# Patient Record
Sex: FEMALE | Race: BLACK OR AFRICAN AMERICAN | NOT HISPANIC OR LATINO
[De-identification: names, ages, dates, MRNs, and addresses within clinical notes are randomized per-mention and may not be internally consistent; named-entity substitution may affect disease eponyms.]

---

## 2021-11-05 PROBLEM — Z00.00 ENCOUNTER FOR PREVENTIVE HEALTH EXAMINATION: Status: ACTIVE | Noted: 2021-11-05

## 2021-11-30 ENCOUNTER — NON-APPOINTMENT (OUTPATIENT)
Age: 41
End: 2021-11-30

## 2021-11-30 ENCOUNTER — APPOINTMENT (OUTPATIENT)
Dept: ORTHOPEDIC SURGERY | Facility: CLINIC | Age: 41
End: 2021-11-30
Payer: COMMERCIAL

## 2021-11-30 VITALS
HEIGHT: 70 IN | DIASTOLIC BLOOD PRESSURE: 91 MMHG | WEIGHT: 293 LBS | SYSTOLIC BLOOD PRESSURE: 148 MMHG | OXYGEN SATURATION: 97 % | HEART RATE: 65 BPM | BODY MASS INDEX: 41.95 KG/M2

## 2021-11-30 PROCEDURE — 99204 OFFICE O/P NEW MOD 45 MIN: CPT | Mod: 25

## 2021-11-30 PROCEDURE — 73564 X-RAY EXAM KNEE 4 OR MORE: CPT | Mod: RT

## 2021-11-30 PROCEDURE — 20611 DRAIN/INJ JOINT/BURSA W/US: CPT | Mod: RT

## 2021-11-30 RX ORDER — DICLOFENAC SODIUM 50 MG/1
50 TABLET, DELAYED RELEASE ORAL
Qty: 60 | Refills: 1 | Status: ACTIVE | COMMUNITY
Start: 2021-11-30 | End: 1900-01-01

## 2021-11-30 NOTE — PHYSICAL EXAM
[de-identified] : Physical Examination\par General: well nourished, in no acute distress, alert and oriented to person, place and time\par Psychiatric: normal mood and affect, no abnormal movements or speech patterns\par Eyes: vision intact [Without] glasses\par \par \par Musculoskeletal Examination\par Ambulation	+ antalgic gait, [-] assistive devices\par \par Knee			Right			Left\par General\par      Swelling/Deformity	normal			normal	\par      Skin			normal			normal\par      Erythema		-			-\par      Standing Alignment	neutral			neutral\par      Effusion		trace			trace\par Range of Motion\par      Hip			full painless ROM		full painless ROM\par      Knee Flexion		100			100\par      Knee Extension	0			0\par Patella\par      J Sign		-			-\par      Quad Medial/Lateral	1/1 1/1\par      Apprehension		-			-\par      Renato's		+			+\par      Grind Sign		+			+\par      Crepitus		+			+\par Palpation\par      Medial Joint Line	+			+\par      Medial Fem Condyle	-			-\par      Lateral Joint Line	-			-\par      Quad Tendon		-			-\par      Patella Tendon	-			-\par      Medial Patella		-			-\par      Lateral Patella 	-			-\par      Posterior Knee	+			+\par Ligamentous\par      Varus @ 0° / 30°	-/-			-/-\par      Valgus @ 0° / 30°	-/-			-/-\par \par Strength Examination/Atrophy\par      Hip Flexors 		5+			5+\par      Quadriceps		5+			5+\par      Hamstring		5+			5+\par      Tibialis Anterior	5+			5+\par      Achilles/Soleus	5+			5+\par Sensation\par      Deep Peroneal	normal			normal\par      Superficial Peroneal 	normal			normal\par      Sural  		normal			normal\par      Posterior Tibial 	normal			normal\par      Saphneous 		normal			normal\par Pulses\par      DP			2+			2+\par  [de-identified] : 4 views of the affected bilateral knee (standing AP, flexing standing AP, 30degree flexed lateral, sunrise view)\par were ordered, obtained and evaluated by myself today and\par demonstrate:\par \par RIGHT\par There is moderate to severe medial weightbearing asymmetric narrowing\par Small to moderate osteophytic lipping\par Trace suprapatellar effusion\par Moderate patellofemoral joint space loss without evidence of tilt [or] subluxation on sunrise view\par Normal soft tissue density\par Otherwise normal osseous bone structure without fracture or dislocation\par \par LEFT\par There is severe lateral flexing asymmetric narrowing\par Small to moderate osteophytic lipping\par Trace suprapatellar effusion\par Moderate patellofemoral joint space loss without evidence of tilt [or] subluxation on sunrise view\par Normal soft tissue density\par Otherwise normal osseous bone structure without fracture or dislocation

## 2021-11-30 NOTE — HISTORY OF PRESENT ILLNESS
[de-identified] : CC Bilateral knee\par \par HPI 40 yo female presents with [chronic] onset of many years of activity related pain in the anterior and medial Bilateral knee [without injury]. The pain is [worse], and rated a 8  out of 10, described as sharp, [without radiation]. [Rest] makes the pain better and [walking standing stairs] makes the pain worse. The patient reports associated symptoms of pain stability. The patient [Denies] pain at night affecting sleep, and [Reports] similar pain previously[].\par \par The patient has tried the following treatments:\par Activity modification	[-]\par Ice/Compression  	[-]\par Braces    		[-]\par Nsaids    		[-]\par Physical Therapy 	+ soime help\par Cortisone Injection	[-]\par Visco Injection		[-]\par Zilretta			[-]\par Arthroscopy		[-]\par \par Review of Systems is positive for the above musculoskeletal symptoms and is otherwise non-contributory for general, constitutional, psychiatric, neurologic, HEENT, cardiac, respiratory, gastrointestinal, reproductive, lymphatic, and dermatologic complaints.\par \par Consult by Dr Catalina Fabian

## 2021-11-30 NOTE — DISCUSSION/SUMMARY
[de-identified] : Bilateral knee osteoarthritis\par \par \par The patient and I discussed the causes and progression of degenerative joint disease of the knee. Models, diagrams and drawings were used in the discussion. Treatment can include conservative non-operative management and surgical options. Conservative management includes weight loss, activity modification, physical therapy to improve motion and strength in the muscles around the knee and the body's core, PO and topical NSAIDs, corticosteroid and/or viscosupplementation intra-articular injections. If the patient fails to improve with non-operative management, surgical management is possible. Depending upon the patient's age, BMI, activity level, degree and location of arthrosis different surgical options are possible including arthroscopic debridement with chondroplasty, high-tibial osteotomy, unicondylar/partial arthroplasty, and total joint arthroplasty.\par \par Physical therapy was prescribed for knee ROM exercises, strengthening exercises, deep tissue massage, core strengthening, hip abductor strengthening, VMO strengthening, modalities PRN, and home exercises\par \par The patient was prescribed Diclofenac PO non-steroidal anti-inflammatory medication. 50mg tablets twice daily to be taken for at least 1-2 weeks in a row and then PRN afterwards. Risks and benefits were discussed and include but not limited to renal damage and GI ulceration and bleeding.  They were advised to take with food to limit stomach upset as well as warned to stop the medication if worsening gastric pain or dizziness or other side effects. Also to immediately stop the medication and seek appropriate medical attention if any severe stomach ache, gastritis, black/red vomit, black/red stools or any other medical concern.\par \par Procedure Note:\par \par Verbal consent was obtained for an arthrocentesis and intra-articular corticosteroid injection of the LEFT and RIGHT knee, after the risks and benefits were discussed with the patient. Potential adverse effects were discussed including but not limited to bleeding, skin/joint infection, local skin reactions including bleaching, bruising, stiffness, soreness, vasovagal episodes, transient hyperglycemia, avascular necrosis, pseudo-septic type reactions, post injection joint pain, allergic reaction to product or anesthetic and other rare but potential adverse effects along with benefits including decreased pain and improved stability prior to obtaining verbal informed consent. It was also discussed that for some patients the treatment is ineffective and there are no guarantees that the patient will experience improvement as the result of the injection. In rare occasions the injection can cause worsening of pain.\par \par The use of a Sonosite 15-6 MHz linear transducer with live ultrasound guidance of the knee was necessary given the patient's BMI and local body habitus overlying and obscuring the accurate identification of normal body bony anatomy used to identify the injection site and the depth of soft tissue envelope necessitating a longer than normal needle to reach the joint space, and to confirm the location of the needle tip and intra-articular delivery of the medication. Without the use of live ultrasound guidance the injection would have been more difficult and place the patient's neurovascular structures at risk from the longer needle needed to traverse the soft tissue envelope.\par \par A 6 inch bolster was placed underneath the BILATERAL knee to place the knee in a slightly flexed position.\par \par The LEFT knee superolateral injection site was identified using the ultrasound probe to identify the suprapatellar space and superior boarder of the patella first longitudinally and then transversely. The injection site was marked and prepped with a ChloraPrep swab and anesthetized with ethylchloride skin anesthesia. Using sterile technique a 20g 3 1/2 in spinal needle with 3 cc total of 1cc 1% lidocaine without epinephrine, 1cc 0.25% Marcaine without epinephrine and 1cc of 40mg/mL Kenalog was passed through the injection site towards the suprapatellar space under live ultrasound guidance and noted to penetrate the joint capsule. The medication was injected without resistance under live ultrasound visualization and noted to flow into the suprapatellar joint space. The injection site was sterilely dressed, there was minimal blood loss.\par \par The RIGHT knee superolateral injection site was identified using the ultrasound probe to identify the suprapatellar space and superior boarder of the patella first longitudinally and then transversely. The injection site was marked and prepped with a ChloraPrep swab and anesthetized with ethylchloride skin anesthesia. Using sterile technique a 20g 3 1/2 in spinal needle with 3 cc total of 1cc 1% lidocaine without epinephrine, 1cc 0.25% Marcaine without epinephrine and 1cc of 40mg/mL Kenalog was passed through the injection site towards the suprapatellar space under live ultrasound guidance and noted to penetrate the joint capsule. The medication was injected without resistance under live ultrasound visualization and noted to flow into the suprapatellar joint space. The injection site was sterilely dressed, there was minimal blood loss.\par \par The patient tolerated this procedure without any complications done by myself. Images were recorded and saved.\par \par The patient has been advised that if they notice any worsening of symptoms or any problems to contact me and seek care from a qualified medical professional. The patient was instructed to ice the knee and take NSAID medication on an as needed basis if the patient feels discomfort.\par \par \par \par \par \par The patient verifies their understanding the the visit, diagnosis and plan. They agree with the treatment plan and will contact the office with any questions or problems.\par Follow up\par PRN

## 2022-01-10 ENCOUNTER — EMERGENCY (EMERGENCY)
Facility: HOSPITAL | Age: 42
LOS: 1 days | Discharge: HOME | End: 2022-01-10
Attending: EMERGENCY MEDICINE | Admitting: EMERGENCY MEDICINE
Payer: COMMERCIAL

## 2022-01-10 VITALS
OXYGEN SATURATION: 99 % | HEART RATE: 71 BPM | RESPIRATION RATE: 18 BRPM | TEMPERATURE: 97 F | DIASTOLIC BLOOD PRESSURE: 87 MMHG | SYSTOLIC BLOOD PRESSURE: 142 MMHG

## 2022-01-10 DIAGNOSIS — D21.9 BENIGN NEOPLASM OF CONNECTIVE AND OTHER SOFT TISSUE, UNSPECIFIED: Chronic | ICD-10-CM

## 2022-01-10 PROCEDURE — 99284 EMERGENCY DEPT VISIT MOD MDM: CPT

## 2022-01-10 RX ORDER — ACETAMINOPHEN 500 MG
650 TABLET ORAL ONCE
Refills: 0 | Status: COMPLETED | OUTPATIENT
Start: 2022-01-10 | End: 2022-01-10

## 2022-01-10 RX ADMIN — Medication 650 MILLIGRAM(S): at 20:47

## 2022-01-10 NOTE — ED PROVIDER NOTE - OBJECTIVE STATEMENT
42 y/o female with a hx HTN, Anemia, presents to the ER c/o 5 days of congestion, fatigue, headache.  Pt reports COVID exposure.  Pt is COVID vaccinated.  Pt denies chest pain, sob, nausea, vomiting, abdominal pain.

## 2022-01-10 NOTE — ED ADULT TRIAGE NOTE - CHIEF COMPLAINT QUOTE
Pt c/o congestion, headache, nausea, fatigue  x 4 days and reports Covid exposure. PMH htn Pt c/o congestion, headache, fatigue  x 4 days and reports Covid exposure. PMH htn

## 2022-01-10 NOTE — ED ADULT NURSE NOTE - NSFALLRSKASSESSTYPE_ED_ALL_ED
Problem: Patient Care Overview  Goal: Plan of Care Review  Outcome: Ongoing (interventions implemented as appropriate)  POC reviewed with patient and aunt with both verbalizing understanding. Patient currently in A. Flutter with HR in the 120-140's. Patient on diltiazem gtt at max dosing for floor, 15mg/ml/hr. Heparin gtt infusing and titrated based off antiXa. Patient NPO for cardioversion tomorrow. Consents not signed until patient's mother is present. Fall bundle implemented and patient has remained free of falls and injuries. Patient in no apparent sign of distress, will continue to monitor.        Initial (On Arrival)

## 2022-01-10 NOTE — ED PROVIDER NOTE - CLINICAL SUMMARY MEDICAL DECISION MAKING FREE TEXT BOX
42 y/o female with a hx HTN, Anemia, presents to the ER c/o 5 days of congestion, fatigue, headache.  Pt reports COVID exposure.  Pt is well appearing, NAD, lungs CTA, likely viral/COVID, supportive care, follow up PMD. 40 y/o female with a hx HTN, Anemia, presents to the ER c/o 5 days of congestion, fatigue, headache.  Pt reports COVID exposure.  Pt is well appearing, NAD, lungs CTA, likely viral/COVID, supportive care, follow up PMD.    neeraj: 40 yo woman with 5 days of uri sx/ fatigue/ ha.  had covid exposure.  lungs clear, vs ok.  pt non toxic appearing.  can swab and send home for results.  given instruction for symptomatic treatment

## 2022-01-10 NOTE — ED PROVIDER NOTE - ATTENDING CONTRIBUTION TO CARE
neeraj: 42 yo woman with 5 days of uri sx/ fatigue/ ha.  had covid exposure.  lungs clear, vs ok.  pt non toxic appearing.  can swab and send home for results.  given instruction for symptomatic treatment    I performed a history and physical exam of the patient and discussed their management with the resident and /or advanced care provider. I reviewed the resident and /or ACP's note and agree with the documented findings and plan of care. My medical decison making and observations are found above.

## 2022-01-10 NOTE — ED PROVIDER NOTE - PATIENT PORTAL LINK FT
You can access the FollowMyHealth Patient Portal offered by [facility] by registering at the following website: [email or web address]. By joining [facility]’s FollowMyHealth portal, you will also be able to view your health information using other applications (apps) compatible with our system.

## 2022-01-10 NOTE — ED ADULT NURSE NOTE - OBJECTIVE STATEMENT
Patient came in with the complaints of Cold symptoms.Swab and UCG Done.Medications given as ordered.Patient tolerated well.

## 2022-01-11 LAB — SARS-COV-2 RNA SPEC QL NAA+PROBE: DETECTED

## 2022-02-28 PROBLEM — D64.9 ANEMIA, UNSPECIFIED: Chronic | Status: ACTIVE | Noted: 2022-01-10

## 2022-02-28 PROBLEM — I10 ESSENTIAL (PRIMARY) HYPERTENSION: Chronic | Status: ACTIVE | Noted: 2022-01-10

## 2022-03-22 ENCOUNTER — APPOINTMENT (OUTPATIENT)
Dept: ORTHOPEDIC SURGERY | Facility: CLINIC | Age: 42
End: 2022-03-22
Payer: COMMERCIAL

## 2022-03-22 NOTE — HISTORY OF PRESENT ILLNESS
[de-identified] : CC Bilateral knee\par \par HPI 42 yo female presents with 4 mth csi, nsaids fu to [chronic] onset of many years of activity related pain in the anterior and medial Bilateral knee [without injury]. The pain is [worse], and rated a 8  out of 10, described as sharp, [without radiation]. [Rest] makes the pain better and [walking standing stairs] makes the pain worse. The patient reports associated symptoms of pain stability. The patient [Denies] pain at night affecting sleep, and [Reports] similar pain previously[].\par \par The patient has tried the following treatments:\par Activity modification	[-]\par Ice/Compression  	[-]\par Braces    		[-]\par Nsaids    		[-]\par Physical Therapy 	+ soime help\par Cortisone Injection	[+] 11-30-21 help \par Visco Injection		[-]\par Zilretta			[-]\par Arthroscopy		[-]\par \par Review of Systems is positive for the above musculoskeletal symptoms and is otherwise non-contributory for general, constitutional, psychiatric, neurologic, HEENT, cardiac, respiratory, gastrointestinal, reproductive, lymphatic, and dermatologic complaints.\par \par Consult by Dr Catalina Fabian

## 2022-03-22 NOTE — PHYSICAL EXAM
[de-identified] : Physical Examination\par General: well nourished, in no acute distress, alert and oriented to person, place and time\par Psychiatric: normal mood and affect, no abnormal movements or speech patterns\par Eyes: vision intact [Without] glasses\par \par \par Musculoskeletal Examination\par Ambulation	+ antalgic gait, [-] assistive devices\par \par Knee			Right			Left\par General\par      Swelling/Deformity	normal			normal	\par      Skin			normal			normal\par      Erythema		-			-\par      Standing Alignment	neutral			neutral\par      Effusion		trace			trace\par Range of Motion\par      Hip			full painless ROM		full painless ROM\par      Knee Flexion		100			100\par      Knee Extension	0			0\par Patella\par      J Sign		-			-\par      Quad Medial/Lateral	1/1 1/1\par      Apprehension		-			-\par      Renato's		+			+\par      Grind Sign		+			+\par      Crepitus		+			+\par Palpation\par      Medial Joint Line	+			+\par      Medial Fem Condyle	-			-\par      Lateral Joint Line	-			-\par      Quad Tendon		-			-\par      Patella Tendon	-			-\par      Medial Patella		-			-\par      Lateral Patella 	-			-\par      Posterior Knee	+			+\par Ligamentous\par      Varus @ 0° / 30°	-/-			-/-\par      Valgus @ 0° / 30°	-/-			-/-\par \par Strength Examination/Atrophy\par      Hip Flexors 		5+			5+\par      Quadriceps		5+			5+\par      Hamstring		5+			5+\par      Tibialis Anterior	5+			5+\par      Achilles/Soleus	5+			5+\par Sensation\par      Deep Peroneal	normal			normal\par      Superficial Peroneal 	normal			normal\par      Sural  		normal			normal\par      Posterior Tibial 	normal			normal\par      Saphneous 		normal			normal\par Pulses\par      DP			2+			2+\par  [de-identified] : 4 views of the affected bilateral knee (standing AP, flexing standing AP, 30degree flexed lateral, sunrise view)\par 11-30-21 demonstrate: \par \par RIGHT\par There is moderate to severe medial weightbearing asymmetric narrowing\par Small to moderate osteophytic lipping\par Trace suprapatellar effusion\par Moderate patellofemoral joint space loss without evidence of tilt [or] subluxation on sunrise view\par Normal soft tissue density\par Otherwise normal osseous bone structure without fracture or dislocation\par \par LEFT\par There is severe lateral flexing asymmetric narrowing\par Small to moderate osteophytic lipping\par Trace suprapatellar effusion\par Moderate patellofemoral joint space loss without evidence of tilt [or] subluxation on sunrise view\par Normal soft tissue density\par Otherwise normal osseous bone structure without fracture or dislocation

## 2022-03-22 NOTE — DISCUSSION/SUMMARY
[de-identified] : Bilateral knee osteoarthritis\par \par \par The patient and I discussed the causes and progression of degenerative joint disease of the knee. Models, diagrams and drawings were used in the discussion. Treatment can include conservative non-operative management and surgical options. Conservative management includes weight loss, activity modification, physical therapy to improve motion and strength in the muscles around the knee and the body's core, PO and topical NSAIDs, corticosteroid and/or viscosupplementation intra-articular injections. If the patient fails to improve with non-operative management, surgical management is possible. Depending upon the patient's age, BMI, activity level, degree and location of arthrosis different surgical options are possible including arthroscopic debridement with chondroplasty, high-tibial osteotomy, unicondylar/partial arthroplasty, and total joint arthroplasty.\par \par Physical therapy was prescribed for knee ROM exercises, strengthening exercises, deep tissue massage, core strengthening, hip abductor strengthening, VMO strengthening, modalities PRN, and home exercises\par \par The patient was prescribed Diclofenac PO non-steroidal anti-inflammatory medication. 50mg tablets twice daily to be taken for at least 1-2 weeks in a row and then PRN afterwards. Risks and benefits were discussed and include but not limited to renal damage and GI ulceration and bleeding.  They were advised to take with food to limit stomach upset as well as warned to stop the medication if worsening gastric pain or dizziness or other side effects. Also to immediately stop the medication and seek appropriate medical attention if any severe stomach ache, gastritis, black/red vomit, black/red stools or any other medical concern.\par \par Procedure Note:\par \par Verbal consent was obtained for an arthrocentesis and intra-articular corticosteroid injection of the LEFT and RIGHT knee, after the risks and benefits were discussed with the patient. Potential adverse effects were discussed including but not limited to bleeding, skin/joint infection, local skin reactions including bleaching, bruising, stiffness, soreness, vasovagal episodes, transient hyperglycemia, avascular necrosis, pseudo-septic type reactions, post injection joint pain, allergic reaction to product or anesthetic and other rare but potential adverse effects along with benefits including decreased pain and improved stability prior to obtaining verbal informed consent. It was also discussed that for some patients the treatment is ineffective and there are no guarantees that the patient will experience improvement as the result of the injection. In rare occasions the injection can cause worsening of pain.\par \par The use of a Sonosite 15-6 MHz linear transducer with live ultrasound guidance of the knee was necessary given the patient's BMI and local body habitus overlying and obscuring the accurate identification of normal body bony anatomy used to identify the injection site and the depth of soft tissue envelope necessitating a longer than normal needle to reach the joint space, and to confirm the location of the needle tip and intra-articular delivery of the medication. Without the use of live ultrasound guidance the injection would have been more difficult and place the patient's neurovascular structures at risk from the longer needle needed to traverse the soft tissue envelope.\par \par A 6 inch bolster was placed underneath the BILATERAL knee to place the knee in a slightly flexed position.\par \par The LEFT knee superolateral injection site was identified using the ultrasound probe to identify the suprapatellar space and superior boarder of the patella first longitudinally and then transversely. The injection site was marked and prepped with a ChloraPrep swab and anesthetized with ethylchloride skin anesthesia. Using sterile technique a 20g 3 1/2 in spinal needle with 3 cc total of 1cc 1% lidocaine without epinephrine, 1cc 0.25% Marcaine without epinephrine and 1cc of 40mg/mL Kenalog was passed through the injection site towards the suprapatellar space under live ultrasound guidance and noted to penetrate the joint capsule. The medication was injected without resistance under live ultrasound visualization and noted to flow into the suprapatellar joint space. The injection site was sterilely dressed, there was minimal blood loss.\par \par The RIGHT knee superolateral injection site was identified using the ultrasound probe to identify the suprapatellar space and superior boarder of the patella first longitudinally and then transversely. The injection site was marked and prepped with a ChloraPrep swab and anesthetized with ethylchloride skin anesthesia. Using sterile technique a 20g 3 1/2 in spinal needle with 3 cc total of 1cc 1% lidocaine without epinephrine, 1cc 0.25% Marcaine without epinephrine and 1cc of 40mg/mL Kenalog was passed through the injection site towards the suprapatellar space under live ultrasound guidance and noted to penetrate the joint capsule. The medication was injected without resistance under live ultrasound visualization and noted to flow into the suprapatellar joint space. The injection site was sterilely dressed, there was minimal blood loss.\par \par The patient tolerated this procedure without any complications done by myself. Images were recorded and saved.\par \par The patient has been advised that if they notice any worsening of symptoms or any problems to contact me and seek care from a qualified medical professional. The patient was instructed to ice the knee and take NSAID medication on an as needed basis if the patient feels discomfort.\par \par \par \par \par \par The patient verifies their understanding the the visit, diagnosis and plan. They agree with the treatment plan and will contact the office with any questions or problems.\par Follow up\par PRN

## 2022-04-15 ENCOUNTER — APPOINTMENT (OUTPATIENT)
Dept: ORTHOPEDIC SURGERY | Facility: CLINIC | Age: 42
End: 2022-04-15
Payer: COMMERCIAL

## 2022-04-15 PROCEDURE — 99214 OFFICE O/P EST MOD 30 MIN: CPT | Mod: 25

## 2022-04-15 PROCEDURE — 20611 DRAIN/INJ JOINT/BURSA W/US: CPT | Mod: 50

## 2022-04-15 RX ORDER — HYLAN G-F 20 16MG/2ML
48 SYRINGE (ML) INTRAARTICULAR
Qty: 2 | Refills: 0 | Status: ACTIVE | OUTPATIENT
Start: 2022-04-15

## 2022-04-15 RX ORDER — DICLOFENAC SODIUM 50 MG/1
50 TABLET, DELAYED RELEASE ORAL
Qty: 60 | Refills: 1 | Status: ACTIVE | COMMUNITY
Start: 2022-04-15 | End: 1900-01-01

## 2022-04-15 NOTE — HISTORY OF PRESENT ILLNESS
[de-identified] : CC Bilateral knee\par \par HPI 40 yo female presents for corticosteroid injection physical therapy and nonsteroidal anti-inflammatory 6-month follow-up of [chronic] onset of many years of activity related pain in the anterior and medial Bilateral knee [without injury]. The pain is [worse], and rated a 8  out of 10, described as sharp, [without radiation]. [Rest] makes the pain better and [walking standing stairs] makes the pain worse. The patient reports associated symptoms of pain stability. The patient [Denies] pain at night affecting sleep, and [Reports] similar pain previously[].\par \par The patient has tried the following treatments:\par Activity modification	+\par Ice/Compression  	+\par Braces    		[-]\par Nsaids    		+\par Physical Therapy 	+ soime help\par Cortisone Injection	+ November 2021 with 3 to 4-month improvement\par Visco Injection		[-]\par Zilretta			[-]\par Arthroscopy		[-]\par \par Worsening pain to 10 out of 10 over the last several weeks\par \par Review of Systems is positive for the above musculoskeletal symptoms and is otherwise non-contributory for general, constitutional, psychiatric, neurologic, HEENT, cardiac, respiratory, gastrointestinal, reproductive, lymphatic, and dermatologic complaints.\par \par Consult by Dr Catalina Fabian

## 2022-04-15 NOTE — PHYSICAL EXAM
[de-identified] : Physical Examination\par General: well nourished, in no acute distress, alert and oriented to person, place and time\par Psychiatric: normal mood and affect, no abnormal movements or speech patterns\par Eyes: vision intact [Without] glasses\par \par \par Musculoskeletal Examination\par Ambulation	+ antalgic gait, [-] assistive devices\par \par Knee			Right			Left\par General\par      Swelling/Deformity	normal			normal	\par      Skin			normal			normal\par      Erythema		-			-\par      Standing Alignment	neutral			neutral\par      Effusion		trace			trace\par Range of Motion\par      Hip			full painless ROM		full painless ROM\par      Knee Flexion		100			100\par      Knee Extension	0			0\par Patella\par      J Sign		-			-\par      Quad Medial/Lateral	1/1 1/1\par      Apprehension		-			-\par      Renato's		+			+\par      Grind Sign		+			+\par      Crepitus		+			+\par Palpation\par      Medial Joint Line	+			+\par      Medial Fem Condyle	-			-\par      Lateral Joint Line	-			-\par      Quad Tendon		-			-\par      Patella Tendon	-			-\par      Medial Patella		-			-\par      Lateral Patella 	-			-\par      Posterior Knee	+			+\par Ligamentous\par      Varus @ 0° / 30°	-/-			-/-\par      Valgus @ 0° / 30°	-/-			-/-\par \par Strength Examination/Atrophy\par      Hip Flexors 		5+			5+\par      Quadriceps		5+			5+\par      Hamstring		5+			5+\par      Tibialis Anterior	5+			5+\par      Achilles/Soleus	5+			5+\par Sensation\par      Deep Peroneal	normal			normal\par      Superficial Peroneal 	normal			normal\par      Sural  		normal			normal\par      Posterior Tibial 	normal			normal\par      Saphneous 		normal			normal\par Pulses\par      DP			2+			2+\par  [de-identified] : 4 views of the affected bilateral knee (standing AP, flexing standing AP, 30degree flexed lateral, sunrise view)\par 11/20/2021\par demonstrate:\par \par RIGHT\par There is moderate to severe medial weightbearing asymmetric narrowing\par Small to moderate osteophytic lipping\par Trace suprapatellar effusion\par Moderate patellofemoral joint space loss without evidence of tilt [or] subluxation on sunrise view\par Normal soft tissue density\par Otherwise normal osseous bone structure without fracture or dislocation\par \par LEFT\par There is severe lateral flexing asymmetric narrowing\par Small to moderate osteophytic lipping\par Trace suprapatellar effusion\par Moderate patellofemoral joint space loss without evidence of tilt [or] subluxation on sunrise view\par Normal soft tissue density\par Otherwise normal osseous bone structure without fracture or dislocation

## 2022-04-15 NOTE — DISCUSSION/SUMMARY
[de-identified] : Bilateral knee osteoarthritis\par \par \par The patient and I discussed the causes and progression of degenerative joint disease of the knee. Models, diagrams and drawings were used in the discussion. Treatment can include conservative non-operative management and surgical options. Conservative management includes weight loss, activity modification, physical therapy to improve motion and strength in the muscles around the knee and the body's core, PO and topical NSAIDs, corticosteroid and/or viscosupplementation intra-articular injections. If the patient fails to improve with non-operative management, surgical management is possible. Depending upon the patient's age, BMI, activity level, degree and location of arthrosis different surgical options are possible including arthroscopic debridement with chondroplasty, high-tibial osteotomy, unicondylar/partial arthroplasty, and total joint arthroplasty.\par \par Physical therapy was prescribed for knee ROM exercises, strengthening exercises, deep tissue massage, core strengthening, hip abductor strengthening, VMO strengthening, modalities PRN, and home exercises\par \par The patient was prescribed Diclofenac PO non-steroidal anti-inflammatory medication. 50mg tablets twice daily to be taken for at least 1-2 weeks in a row and then PRN afterwards. Risks and benefits were discussed and include but not limited to renal damage and GI ulceration and bleeding.  They were advised to take with food to limit stomach upset as well as warned to stop the medication if worsening gastric pain or dizziness or other side effects. Also to immediately stop the medication and seek appropriate medical attention if any severe stomach ache, gastritis, black/red vomit, black/red stools or any other medical concern.\par \par Procedure Note:\par \par Verbal consent was obtained for an arthrocentesis and intra-articular corticosteroid injection of the LEFT and RIGHT knee, after the risks and benefits were discussed with the patient. Potential adverse effects were discussed including but not limited to bleeding, skin/joint infection, local skin reactions including bleaching, bruising, stiffness, soreness, vasovagal episodes, transient hyperglycemia, avascular necrosis, pseudo-septic type reactions, post injection joint pain, allergic reaction to product or anesthetic and other rare but potential adverse effects along with benefits including decreased pain and improved stability prior to obtaining verbal informed consent. It was also discussed that for some patients the treatment is ineffective and there are no guarantees that the patient will experience improvement as the result of the injection. In rare occasions the injection can cause worsening of pain.\par \par The use of a Sonosite 15-6 MHz linear transducer with live ultrasound guidance of the knee was necessary given the patient's BMI and local body habitus overlying and obscuring the accurate identification of normal body bony anatomy used to identify the injection site and the depth of soft tissue envelope necessitating a longer than normal needle to reach the joint space, and to confirm the location of the needle tip and intra-articular delivery of the medication. Without the use of live ultrasound guidance the injection would have been more difficult and place the patient's neurovascular structures at risk from the longer needle needed to traverse the soft tissue envelope.\par \par A 6 inch bolster was placed underneath the BILATERAL knee to place the knee in a slightly flexed position.\par \par The LEFT knee superolateral injection site was identified using the ultrasound probe to identify the suprapatellar space and superior boarder of the patella first longitudinally and then transversely. The injection site was marked and prepped with a ChloraPrep swab and anesthetized with ethylchloride skin anesthesia. Using sterile technique a 20g 3 1/2 in spinal needle with 3 cc total of 1cc 1% lidocaine without epinephrine, 1cc 0.25% Marcaine without epinephrine and 1cc of 40mg/mL Kenalog was passed through the injection site towards the suprapatellar space under live ultrasound guidance and noted to penetrate the joint capsule. The medication was injected without resistance under live ultrasound visualization and noted to flow into the suprapatellar joint space. The injection site was sterilely dressed, there was minimal blood loss.\par \par The RIGHT knee superolateral injection site was identified using the ultrasound probe to identify the suprapatellar space and superior boarder of the patella first longitudinally and then transversely. The injection site was marked and prepped with a ChloraPrep swab and anesthetized with ethylchloride skin anesthesia. Using sterile technique a 20g 3 1/2 in spinal needle with 3 cc total of 1cc 1% lidocaine without epinephrine, 1cc 0.25% Marcaine without epinephrine and 1cc of 40mg/mL Kenalog was passed through the injection site towards the suprapatellar space under live ultrasound guidance and noted to penetrate the joint capsule. The medication was injected without resistance under live ultrasound visualization and noted to flow into the suprapatellar joint space. The injection site was sterilely dressed, there was minimal blood loss.\par \par The patient tolerated this procedure without any complications done by myself. Images were recorded and saved.\par \par The patient has been advised that if they notice any worsening of symptoms or any problems to contact me and seek care from a qualified medical professional. The patient was instructed to ice the knee and take NSAID medication on an as needed basis if the patient feels discomfort.\par \par \par Due to failure of prior non-operative modalities of treatment including physical therapy, activity modification, non-steroidal anti-inflammatory medications, and weight-loss with failure of multiple prior corticosteroid injections without appropriate improvement in symptoms and concern for too many frequent injections causing increased risk for adverse events, I am ordering a viscosupplementation injection Synvisc 1 bilateral knees and will obtain insurance authorization. The patient will be contacted by our authorization department over its status, copayment and when available for injection.\par \par \par The patient verifies their understanding the the visit, diagnosis and plan. They agree with the treatment plan and will contact the office with any questions or problems.\par Follow up\par PRN

## 2022-05-27 ENCOUNTER — APPOINTMENT (OUTPATIENT)
Dept: ORTHOPEDIC SURGERY | Facility: CLINIC | Age: 42
End: 2022-05-27
Payer: COMMERCIAL

## 2022-05-27 PROCEDURE — 20611 DRAIN/INJ JOINT/BURSA W/US: CPT | Mod: 50

## 2022-05-27 NOTE — PROCEDURE
[de-identified] : Chief complaint:     Bilateral knee pain\par \par Diagnosis:              Bilateral knee osteoarthritis\par \par \par Injection:\par Synvisc one x2\par      CR  expiration 4–23\par \par \par Procedure Note:\par \par Risks and benefits of an arthrocentesis and intraarticular hyaluronic injection of the LEFT and RIGHT knee were discussed with the patient. Potential adverse effects were discussed including but not limited to bleeding, skin/ joint infection, local skin reactions including bleaching, bruising, stiffness, soreness, vasovagal episodes, transient hyperglycemia, avascular necrosis, pseudo-septic type reactions, post injection joint pain, allergic reaction to product or anesthetic and other rare but potential adverse effects along with benefits including decreased pain and improved stability prior to obtaining verbal informed consent. It was also discussed that for some patients the treatment is ineffective and there are no guarantees that the patient will experience improvement as the result of the injection. In rare occasions the injection can cause worsening of pain.\par \par The use of a Sonosite 15-6 MHz linear transducer with live ultrasound guidance of the knee was necessary given the patient's BMI and local body habitus overlying and obscuring the accurate identification of normal body bony anatomy used to identify the injection site and the depth of soft tissue envelope necessitating a longer than normal needle to reach the joint space, and to confirm the location of the needle tip and intra-articular delivery of the medication. Without the use of live ultrasound guidance the injection would have been more difficult and place the patient's neurovascular structures at risk from the longer needle needed to traverse the soft tissue envelope.\par \par A 6 inch bolster was placed underneath the knee to place the LEFT knee in a slightly flexed position. The superolateral injection site was identified using the ultrasound probe to identify the suprapatellar space and superior boarder of the patella first longitudinally and then transversely. The injection site was marked and prepped with a ChloraPrep swab and anesthetized with ethylchloride skin anesthesia. Under sterile technique a 20g 3 1/2 in spinal needle with a preloaded viscosupplementation syringe was passed through the injection site towards the suprapatellar space under live ultrasound guidance and noted to penetrate the joint capsule. The medication was injected without resistance under live ultrasound visualization and noted to flow into the suprapatellar joint space. The injection site was sterilely dressed, there was minimal blood loss.\par \par A 6 inch bolster was placed underneath the knee to place the RIGHT knee in a slightly flexed position. The superolateral injection site was identified using the ultrasound probe to identify the suprapatellar space and superior boarder of the patella first longitudinally and then transversely. The injection site was marked and prepped with a ChloraPrep swab and anesthetized with ethylchloride skin anesthesia. Under sterile technique a 20g 3 1/2 in spinal needle with a preloaded viscosupplementation syringe was passed through the injection site towards the suprapatellar space under live ultrasound guidance and noted to penetrate the joint capsule. The medication was injected without resistance under live ultrasound visualization and noted to flow into the suprapatellar joint space. The injection site was sterilely dressed, there was minimal blood loss.\par \par The patient tolerated this procedure without any complications done by myself. Images were recorded and saved.\par \par The patient has been advised that if they notice any worsening of symptoms or any problems to contact me and seek care from a qualified medical professional. The patient was instructed to ice the knee and take NSAID medication on an as needed basis if the patient feels discomfort.\par \par Followup injection [6 months]

## 2022-07-10 ENCOUNTER — EMERGENCY (EMERGENCY)
Facility: HOSPITAL | Age: 42
LOS: 1 days | Discharge: HOME | End: 2022-07-10
Attending: STUDENT IN AN ORGANIZED HEALTH CARE EDUCATION/TRAINING PROGRAM | Admitting: STUDENT IN AN ORGANIZED HEALTH CARE EDUCATION/TRAINING PROGRAM

## 2022-07-10 VITALS
DIASTOLIC BLOOD PRESSURE: 77 MMHG | HEART RATE: 67 BPM | OXYGEN SATURATION: 100 % | RESPIRATION RATE: 16 BRPM | TEMPERATURE: 97 F | SYSTOLIC BLOOD PRESSURE: 145 MMHG

## 2022-07-10 VITALS
DIASTOLIC BLOOD PRESSURE: 84 MMHG | SYSTOLIC BLOOD PRESSURE: 159 MMHG | TEMPERATURE: 98 F | RESPIRATION RATE: 18 BRPM | OXYGEN SATURATION: 100 % | HEART RATE: 76 BPM

## 2022-07-10 DIAGNOSIS — D21.9 BENIGN NEOPLASM OF CONNECTIVE AND OTHER SOFT TISSUE, UNSPECIFIED: Chronic | ICD-10-CM

## 2022-07-10 LAB
ALBUMIN SERPL ELPH-MCNC: 4 G/DL — SIGNIFICANT CHANGE UP (ref 3.3–5)
ALP SERPL-CCNC: 59 U/L — SIGNIFICANT CHANGE UP (ref 40–120)
ALT FLD-CCNC: 15 U/L — SIGNIFICANT CHANGE UP (ref 4–33)
ANION GAP SERPL CALC-SCNC: 11 MMOL/L — SIGNIFICANT CHANGE UP (ref 7–14)
AST SERPL-CCNC: 16 U/L — SIGNIFICANT CHANGE UP (ref 4–32)
BASOPHILS # BLD AUTO: 0.06 K/UL — SIGNIFICANT CHANGE UP (ref 0–0.2)
BASOPHILS NFR BLD AUTO: 0.8 % — SIGNIFICANT CHANGE UP (ref 0–2)
BILIRUB SERPL-MCNC: 0.4 MG/DL — SIGNIFICANT CHANGE UP (ref 0.2–1.2)
BLD GP AB SCN SERPL QL: NEGATIVE — SIGNIFICANT CHANGE UP
BUN SERPL-MCNC: 12 MG/DL — SIGNIFICANT CHANGE UP (ref 7–23)
CALCIUM SERPL-MCNC: 9.2 MG/DL — SIGNIFICANT CHANGE UP (ref 8.4–10.5)
CHLORIDE SERPL-SCNC: 102 MMOL/L — SIGNIFICANT CHANGE UP (ref 98–107)
CO2 SERPL-SCNC: 24 MMOL/L — SIGNIFICANT CHANGE UP (ref 22–31)
CREAT SERPL-MCNC: 0.7 MG/DL — SIGNIFICANT CHANGE UP (ref 0.5–1.3)
EGFR: 110 ML/MIN/1.73M2 — SIGNIFICANT CHANGE UP
EOSINOPHIL # BLD AUTO: 0.2 K/UL — SIGNIFICANT CHANGE UP (ref 0–0.5)
EOSINOPHIL NFR BLD AUTO: 2.6 % — SIGNIFICANT CHANGE UP (ref 0–6)
FERRITIN SERPL-MCNC: 219 NG/ML — HIGH (ref 15–150)
FLUAV AG NPH QL: SIGNIFICANT CHANGE UP
FLUBV AG NPH QL: SIGNIFICANT CHANGE UP
GIANT PLATELETS BLD QL SMEAR: PRESENT — SIGNIFICANT CHANGE UP
GLUCOSE SERPL-MCNC: 91 MG/DL — SIGNIFICANT CHANGE UP (ref 70–99)
HCG SERPL-ACNC: <5 MIU/ML — SIGNIFICANT CHANGE UP
HCT VFR BLD CALC: 35.2 % — SIGNIFICANT CHANGE UP (ref 34.5–45)
HGB BLD-MCNC: 11.8 G/DL — SIGNIFICANT CHANGE UP (ref 11.5–15.5)
IANC: 4.17 K/UL — SIGNIFICANT CHANGE UP (ref 1.8–7.4)
IRON SATN MFR SERPL: 17 % — SIGNIFICANT CHANGE UP (ref 14–50)
IRON SATN MFR SERPL: 43 UG/DL — SIGNIFICANT CHANGE UP (ref 30–160)
LYMPHOCYTES # BLD AUTO: 2.89 K/UL — SIGNIFICANT CHANGE UP (ref 1–3.3)
LYMPHOCYTES # BLD AUTO: 37.9 % — SIGNIFICANT CHANGE UP (ref 13–44)
MCHC RBC-ENTMCNC: 33.5 GM/DL — SIGNIFICANT CHANGE UP (ref 32–36)
MCHC RBC-ENTMCNC: 36.4 PG — HIGH (ref 27–34)
MCV RBC AUTO: 108.6 FL — HIGH (ref 80–100)
MONOCYTES # BLD AUTO: 0.2 K/UL — SIGNIFICANT CHANGE UP (ref 0–0.9)
MONOCYTES NFR BLD AUTO: 2.6 % — SIGNIFICANT CHANGE UP (ref 2–14)
NEUTROPHILS # BLD AUTO: 4.21 K/UL — SIGNIFICANT CHANGE UP (ref 1.8–7.4)
NEUTROPHILS NFR BLD AUTO: 55.2 % — SIGNIFICANT CHANGE UP (ref 43–77)
PLAT MORPH BLD: NORMAL — SIGNIFICANT CHANGE UP
PLATELET # BLD AUTO: 358 K/UL — SIGNIFICANT CHANGE UP (ref 150–400)
PLATELET COUNT - ESTIMATE: NORMAL — SIGNIFICANT CHANGE UP
POTASSIUM SERPL-MCNC: 4.2 MMOL/L — SIGNIFICANT CHANGE UP (ref 3.5–5.3)
POTASSIUM SERPL-SCNC: 4.2 MMOL/L — SIGNIFICANT CHANGE UP (ref 3.5–5.3)
PROT SERPL-MCNC: 7.6 G/DL — SIGNIFICANT CHANGE UP (ref 6–8.3)
RBC # BLD: 3.24 M/UL — LOW (ref 3.8–5.2)
RBC # FLD: 16.8 % — HIGH (ref 10.3–14.5)
RBC BLD AUTO: NORMAL — SIGNIFICANT CHANGE UP
RH IG SCN BLD-IMP: POSITIVE — SIGNIFICANT CHANGE UP
RSV RNA NPH QL NAA+NON-PROBE: SIGNIFICANT CHANGE UP
SARS-COV-2 RNA SPEC QL NAA+PROBE: SIGNIFICANT CHANGE UP
SODIUM SERPL-SCNC: 137 MMOL/L — SIGNIFICANT CHANGE UP (ref 135–145)
TIBC SERPL-MCNC: 247 UG/DL — SIGNIFICANT CHANGE UP (ref 220–430)
UIBC SERPL-MCNC: 204 UG/DL — SIGNIFICANT CHANGE UP (ref 110–370)
VARIANT LYMPHS # BLD: 0.9 % — SIGNIFICANT CHANGE UP (ref 0–6)
WBC # BLD: 7.63 K/UL — SIGNIFICANT CHANGE UP (ref 3.8–10.5)
WBC # FLD AUTO: 7.63 K/UL — SIGNIFICANT CHANGE UP (ref 3.8–10.5)

## 2022-07-10 PROCEDURE — 93010 ELECTROCARDIOGRAM REPORT: CPT | Mod: NC

## 2022-07-10 PROCEDURE — 99285 EMERGENCY DEPT VISIT HI MDM: CPT | Mod: 25

## 2022-07-10 PROCEDURE — 76830 TRANSVAGINAL US NON-OB: CPT | Mod: 26

## 2022-07-10 NOTE — ED PROVIDER NOTE - CLINICAL SUMMARY MEDICAL DECISION MAKING FREE TEXT BOX
Attending/MD Bipin. 40 yo F, IUD and fibroid, p/w generalized weakness, no physical findings, given + IUD and heavier menstrual perid, will get tras vaginal US, if any abnormal findigns, will get GYn consult, otherwise, out pt GYN follow up, transfusion is according to the result of iron and H&H

## 2022-07-10 NOTE — ED PROVIDER NOTE - ATTENDING CONTRIBUTION TO CARE
I have personally seen and examined this patient.  I have fully participated in the care of this patient. I performed a substantive portion of the visit including all aspects of the medical decision making. I have reviewed all pertinent clinical information, including history, physical exam, plan and the Resident’s note and agree except as noted. - MD Bipin.    see my notes

## 2022-07-10 NOTE — ED ADULT NURSE NOTE - OBJECTIVE STATEMENT
Pt presents to room tr a, alert&orientedx4, ambulatory, pmhx Anemia, Iron Deficiency coming to ED for generalized weaknessx4-5 days. Pt currently has IUD in place, states her menstrual cycle for this month has been heavier than usual, denies any prior hx of PRBC, receives FE shots. Upon arrival, NAD noted, NSR on cardiac monitor, non-diaphoretic. 20g IV established on right ac, labs drawn and sent, pending US.

## 2022-07-10 NOTE — ED PROVIDER NOTE - IV ALTEPLASE EXCL ABS HIDDEN
Quality 402: Tobacco Use And Help With Quitting Among Adolescents: Patient screened for tobacco and never smoked Detail Level: Detailed Quality 111:Pneumonia Vaccination Status For Older Adults: Pneumococcal Vaccination Previously Received Quality 130: Documentation Of Current Medications In The Medical Record: Current Medications Documented Quality 431: Preventive Care And Screening: Unhealthy Alcohol Use - Screening: Patient screened for unhealthy alcohol use using a single question and scores less than 2 times per year Quality 226: Preventive Care And Screening: Tobacco Use: Screening And Cessation Intervention: Patient screened for tobacco use and is an ex/non-smoker show

## 2022-07-10 NOTE — ED PROVIDER NOTE - OBJECTIVE STATEMENT
Attending/MD Bipin. 42 yo F, with anemia, iron deficiency, vit b12 deficiency, uterus fibroid, + IUD, p/w weakness in general x 4d, significantly worsening, pt also noticed heavier menstrual perids, currently on perids,

## 2022-07-10 NOTE — ED ADULT TRIAGE NOTE - CHIEF COMPLAINT QUOTE
weakness x 5 days. soreness to body. denies fever. nausea, decreased appetite since today. denies vomiting ,problems urinating. lmp    present   fs 105

## 2022-07-10 NOTE — ED PROVIDER NOTE - NSFOLLOWUPINSTRUCTIONS_ED_ALL_ED_FT
You were seen for weakness.    Ultrasound showed a leiomyomatous uterus.  You should follow up with your OB Gyn about this finding, on a routine basis.      Your blood count showed no evidence of anemia, but an increased MCV which with your history of B12 deficiency may be a sign that you require B12 testing.  You should follow up with your pcp about this finding.    If you have any severe increase in pain, fever, uncontrollable nausea or vomiting, or inability to tolerate eating and drinking you need to immediately return to the emergency room.

## 2022-07-10 NOTE — ED PROVIDER NOTE - PHYSICAL EXAMINATION
Attending/MD Bipin.   VITALS: reviewed  GEN: No apparent distress, A & O x 4  HEAD/EYES: NC/AT, PERRL, EOMI, anicteric sclerae, no conjunctival pallor  ENT: mucus membranes moist, trachea midline, no JVD, neck is supple  RESP: lungs CTA with equal breath sounds bilaterally, chest wall nontender and atraumatic  CV: heart with reg rhythm S1, S2, no murmur; distal pulses intact and symmetric bilaterally  ABDOMEN: normoactive bowel sounds, soft, nondistended, nontender  MSK: extremities atraumatic and nontender, no edema, no asymmetry.  SKIN: warm, dry, no rash, no bruising, no cyanosis.  NEURO: alert, mentating appropriately, no facial asymmetry.  PSYCH: Affect appropriate

## 2022-07-10 NOTE — ED PROVIDER NOTE - EKG ADDITIONAL INFORMATION FREE TEXT
NSR, no ST segement alterations. sinus rhythm without life-threatening arrhythmic patter such as short or long SD interval, ipsilon wave form, or Brugada pattern.

## 2022-07-10 NOTE — ED PROVIDER NOTE - PATIENT PORTAL LINK FT
You can access the FollowMyHealth Patient Portal offered by [facility] by registering at the following website: [email or web address]. By joining [facility]’s FollowMyHealth portal, you will also be able to view your health information using other applications (apps) compatible with our system.

## 2022-11-17 ENCOUNTER — APPOINTMENT (OUTPATIENT)
Dept: ORTHOPEDIC SURGERY | Facility: CLINIC | Age: 42
End: 2022-11-17
Payer: COMMERCIAL

## 2022-11-17 VITALS
BODY MASS INDEX: 41.95 KG/M2 | DIASTOLIC BLOOD PRESSURE: 93 MMHG | SYSTOLIC BLOOD PRESSURE: 164 MMHG | HEIGHT: 70 IN | WEIGHT: 293 LBS

## 2022-11-17 DIAGNOSIS — E66.9 OBESITY, UNSPECIFIED: ICD-10-CM

## 2022-11-17 DIAGNOSIS — I10 ESSENTIAL (PRIMARY) HYPERTENSION: ICD-10-CM

## 2022-11-17 PROCEDURE — 73564 X-RAY EXAM KNEE 4 OR MORE: CPT | Mod: 50

## 2022-11-17 PROCEDURE — 72170 X-RAY EXAM OF PELVIS: CPT

## 2022-11-17 PROCEDURE — 20610 DRAIN/INJ JOINT/BURSA W/O US: CPT | Mod: 50

## 2022-11-17 PROCEDURE — 99214 OFFICE O/P EST MOD 30 MIN: CPT | Mod: 25

## 2022-11-19 NOTE — HISTORY OF PRESENT ILLNESS
[de-identified] : Ms. Olsen is a 42-year-old female presented to the office for evaluation of her bilateral knee pain.  Patient has been experiencing bilateral knee pain since 2016.  She states that she used to work for [employer] and now works for [employer], which causes her to sit more and be less active.  She states that her knee gave out in December 2016 and has experienced knee pain since that time.  Pain is worse with standing and with walking longer distances.  Pain is mostly located over the lateral knees and anterior thighs.  She has tried physical therapy with some relief.  She has tried Advil with some relief.  Patient was previously seen by Dr. Alvarado and received a corticosteroid injection in 2021, which lasted about 3 to 4 months.  She had a viscosupplementation injection in April 2022, which lasted about 7 to 8 months.  She states that her pain has returned over the last few weeks.  No hip or groin pain.  No numbness or tingling.  No recent trauma.  No fevers or chills.  Patient states that if she is looking into bariatric surgery at this time.

## 2022-11-19 NOTE — REVIEW OF SYSTEMS
[Joint Pain] : joint pain [Negative] : Endocrine [Joint Stiffness] : no joint stiffness [Joint Swelling] : no joint swelling [Fever] : no fever [Chills] : no chills [de-identified] : No fevers/chills [de-identified] : No numbness/tingling [de-identified] : History of Anemia

## 2022-11-19 NOTE — PHYSICAL EXAM
[de-identified] : Constitutional:  42 year old female, alert and oriented, cooperative, in no acute distress.\par \par HEENT \par NC/AT.  Appearance: symmetric\par \par Neck/Back\par Straight without deformity or instability.  Good ROM.\par \par Chest/Respiratory \par Respiratory effort: no intercostal retractions or use of accessory muscles. Nonlabored Breathing\par \par Skin \par On inspection, warm and dry without rashes or lesions.\par \par Mental Status: \par Judgment, insight: intact\par Orientation: oriented to time, place, and person\par \par Neurological:\par Sensory and Motor are grossly intact throughout\par \par Left Knee\par \par Inspection:\par     Skin intact, no rashes or lesions\par     No Effusion\par     Non-tender to palpation over tibial tubercle, patella, medial and lateral joint line, and pes insertion.\par \par Range of Motion:\par 	Extension - 0 degrees\par 	Flexion - 105 degrees\par 	Extensor lag: None\par \par Stability:\par      Demonstrates no Varus or Valgus instability\par      Negative Anterior or Posterior drawer.\par      Negative Lachman's\par \par Patella: stable, tracks well. \par \par Right Knee\par \par Inspection:\par     Skin intact, no rashes or lesions\par     No Effusion\par     Non-tender to palpation over tibial tubercle, patella, medial and lateral joint line, and pes insertion.\par \par Range of Motion:\par 	Extension - 0 degrees\par 	Flexion - 110 degrees\par 	Extensor lag: None\par \par Stability:\par      Demonstrates no Varus or Valgus instability\par      Negative Anterior or Posterior drawer.\par      Negative Lachman's\par \par Patella: stable, tracks well. \par \par Neurologic Exam\par     Motor intact including 5/5 Extensor Hallucis Longus, 5/5 Flexor Hallucis Longus, 5/5 Tibialis Anterior and 5/5 Gastrocnemius\par     Sensation Intact to Light Touch including Saphenous, Sural, Superficial Peroneal, Deep Peroneal, Tibial nerve distributions\par \par Vascular Exam\par     Foot is warm and well perfused with 2+ Dorsalis Pedis Pulse \par \par No pain with range of motion of the bilateral hips. No lumbar paraspinal muscle tenderness.  [de-identified] : XRay:  XRays of the Pelvis (1 View) taken in the office today and discussed with the patient. XRays demonstrate no obvious fracture or dislocation. There is joint space narrowing in the bilateral hips. (my personal interpretation).\par \par XRay: XRays of the Right Knee (4 Views) taken in the office today and reviewed with the patient. XRays demonstrate joint space narrowing in the medial compartment with subchondral sclerosis, overlying osteophytes, all consistent with severe osteoarthritis, KL Grade: 3. (my personal interpretation) \par \par XRay: XRays of the Left Knee (4 Views) taken in the office today and reviewed with the patient. XRays demonstrate joint space narrowing in the medial compartment with subchondral sclerosis, overlying osteophytes, all consistent with severe osteoarthritis, KL Grade: 3. (my personal interpretation)

## 2022-11-19 NOTE — DISCUSSION/SUMMARY
[de-identified] : Ms. Olsen is a 42-year-old female presents to the office for evaluation of her bilateral knee pain.  Patient has been experiencing bilateral knee pain for the last 6 years, which is worse with walking and ambulation.  She has tried physical therapy, anti-inflammatories, and injections. XRays showed significant bilateral knee osteoarthritis.  Examination showed slightly decreased range of motion of bilateral knees. Discussed with the patient the examination and imaging findings.  Discussed with the patient the operative and nonoperative management of knee osteoarthritis, including total knee arthroplasty.  Patient would like to continue nonoperative management at this time.  Discussed the nonoperative management of knee osteoarthritis, including physical therapy, anti-inflammatories, and injections.  Patient did not want a referral to physical therapy at this time.  She did not want a scription anti-inflammatory at this time.  Patient would like bilateral knee injections at this time.  Discussed the risks and benefits of corticosteroid and viscosupplementation injections.  Patient would like a corticosteroid injection at this time.  Discussed the risks of corticosteroid injections.  Bilateral knee corticosteroid injections were given using aseptic technique.  Patient tolerated the procedure well.  Additionally, patient would like a referral to bariatric surgery. She was given a referral to Dr. Meeks.  Patient will follow-up in 3 months for reevaluation and management.  Patient understanding and in agreement the plan.  All questions answered .\par \par Plan:\par - Bilateral knee corticosteroid injections given\par - Range of motion and strengthening exercises for bilateral knees\par - Referral to bariatric surgery, Dr. Meeks, given\par - Follow up in 3 months for reevaluation and management of bilateral knees\par \par Procedure Note: Bilateral knee corticosteroid injections\par \par Bilateral knee injections were given today. Risk and benefits of the injections were discussed, including but not limited to infection, fat atrophy, skin discoloration, failure to achieve desired results and elevated blood sugars. \par The area was prepped in the usual sterile fashion. The injection were given with 1 cc (40mg) Methylprednisolone and 2cc  0.25% Bupivacaine and the patient tolerated it well.\par \par Risk of cortisone injections are minimal but present. There is the rare risk of joint infection, skin and soft tissue thinning or whitening of the skin surrounding the injection site, thinning of nearby bone, or the risk of elevated blood glucose in diabetics. Diabetics receiving steroid injection should follow their blood sugars very closely for several days after receiving the injections.  In the event of uncontrolled elevated blood glucose, they should seek medical attention.\par \par There's some concern that repeated use of cortisone shots may cause deterioration of the cartilage within a joint. For this reason, doctors typically limit the number of cortisone shots in a joint. The limit varies depending on the joint and the reason for treatment.\par \par Cortisone shots usually include a corticosteroid medication and a local anesthetic. The local anesthetic helps to numb the joint at the time of the injection and last for several hours. The cortisone acts to relieve pain and inflammation but may take several days to begin to work. Some people do experience a cortisone flare after the injection and may have increased pain for 2 to 3 days after the injection, and then pain can begin to improve.\par \par Patient was instructed to call or return for any signs or symptoms of infection after an injection including increased pain, swelling, redness or fevers. \par

## 2023-03-16 ENCOUNTER — APPOINTMENT (OUTPATIENT)
Dept: ORTHOPEDIC SURGERY | Facility: CLINIC | Age: 43
End: 2023-03-16
Payer: COMMERCIAL

## 2023-03-16 PROCEDURE — 73564 X-RAY EXAM KNEE 4 OR MORE: CPT | Mod: 50

## 2023-03-16 PROCEDURE — 99214 OFFICE O/P EST MOD 30 MIN: CPT | Mod: 25

## 2023-03-16 PROCEDURE — 20610 DRAIN/INJ JOINT/BURSA W/O US: CPT

## 2023-03-19 NOTE — REVIEW OF SYSTEMS
[Joint Pain] : joint pain [Negative] : Endocrine [Joint Stiffness] : no joint stiffness [Joint Swelling] : no joint swelling [Fever] : no fever [Chills] : no chills [de-identified] : Anemia

## 2023-03-19 NOTE — PHYSICAL EXAM
[de-identified] : Constitutional:  42 year old female, alert and oriented, cooperative, in no acute distress.\par \par HEENT \par NC/AT.  Appearance: symmetric\par \par Neck/Back\par Straight without deformity or instability.  Good ROM.\par \par Chest/Respiratory \par Respiratory effort: no intercostal retractions or use of accessory muscles. Nonlabored Breathing\par \par Skin \par On inspection, warm and dry without rashes or lesions.\par \par Mental Status: \par Judgment, insight: intact\par Orientation: oriented to time, place, and person\par \par Neurological:\par Sensory and Motor are grossly intact throughout\par \par Left Knee\par \par Inspection:\par     Skin intact, no rashes or lesions\par     No Effusion\par     Non-tender to palpation over tibial tubercle, patella, medial and lateral joint line, and pes insertion.\par \par Range of Motion:\par 	Extension - 0 degrees\par 	Flexion - 115 degrees\par 	Extensor lag: None\par \par Stability:\par      Demonstrates no Varus or Valgus instability\par      Negative Anterior or Posterior drawer.\par      Negative Lachman's\par \par Patella: stable, tracks well. \par \par Right Knee\par \par Inspection:\par     Skin intact, no rashes or lesions\par     No Effusion\par     Non-tender to palpation over tibial tubercle, patella, medial and lateral joint line, and pes insertion.\par \par Range of Motion:\par 	Extension - 0 degrees\par 	Flexion - 120 degrees\par 	Extensor lag: None\par \par Stability:\par      Demonstrates no Varus or Valgus instability\par      Negative Anterior or Posterior drawer.\par      Negative Lachman's\par \par Patella: stable, tracks well. \par \par Neurologic Exam\par     Motor intact including 5/5 Extensor Hallucis Longus, 5/5 Flexor Hallucis Longus, 5/5 Tibialis Anterior and 5/5 Gastrocnemius\par     Sensation Intact to Light Touch including Saphenous, Sural, Superficial Peroneal, Deep Peroneal, Tibial nerve distributions\par \par Vascular Exam\par     Foot is warm and well perfused with 2+ Dorsalis Pedis Pulse \par \par No pain with range of motion of the bilateral hips. No lumbar paraspinal muscle tenderness.  [de-identified] : XRay:  XRays of the Pelvis (1 View) taken on 11/17/2023. XRays demonstrate no obvious fracture or dislocation. There is joint space narrowing in the bilateral hips. (my personal interpretation).\par \par XRay: XRays of the Right Knee (4 Views) taken in the office today and reviewed with the patient. XRays demonstrate tricompartmental joint space narrowing with subchondral sclerosis, overlying osteophytes, all consistent with severe osteoarthritis, KL Grade: 3. (my personal interpretation) \par \par XRay: XRays of the Left Knee (4 Views) taken in the office today and reviewed with the patient. XRays demonstrate tricompartmental joint space narrowing with subchondral sclerosis, overlying osteophytes, all consistent with severe osteoarthritis, KL Grade: 3. (my personal interpretation)

## 2023-03-19 NOTE — HISTORY OF PRESENT ILLNESS
[de-identified] : Ms. Olsen is a 42-year-old female presented to the office for evaluation of her bilateral knee pain.  Patient has been experiencing bilateral knee pain since 2016.  She states that she used to work for [employer] and now works for [employer], which causes her to sit more and be less active.  She states that her knee gave out in December 2016 and has experienced knee pain since that time.  Pain is worse with standing and with walking longer distances.  Pain is mostly located over the lateral knees and anterior thighs.  She has tried physical therapy with some relief.  She has tried Advil with some relief.  Patient was previously seen by Dr. Alvarado and received a corticosteroid injection in 2021, which lasted about 3 to 4 months.  She had a viscosupplementation injection in April 2022, which lasted about 7 to 8 months.  She states that her pain has returned over the last few weeks.  No hip or groin pain.  No numbness or tingling.  No recent trauma.  No fevers or chills.  Patient states that if she is looking into bariatric surgery at this time.\par \par \par 3/16/2023:\par Patient states that injections helped with her right knee, but she is starting to have left knee pain once again.  She states that she has been walking and moving around more.  Pain is located over the left lateral knee.  She states that the right knee pain is improved, but has some occasional discomfort.  No fevers or chills.

## 2023-03-19 NOTE — DISCUSSION/SUMMARY
[de-identified] : Ms. Olsen is a 42-year-old female who presents the office for follow-up of her bilateral knee pain.  Patient was previously seen in the office and given bilateral knee corticosteroid injections.  Her right knee pain has improved, but experiences some occasional discomfort.  She states that her left knee pain has returned.  X-rays showed significant bilateral knee osteoarthritis.  Examination showed good knee range of motion, slightly decreased on the left compared to the right. Discussed with the patient the examination and imaging findings.  Discussed with the patient the operative and nonoperative management of knee osteoarthritis, including total knee arthroplasty.  Patient would like to continue nonoperative management at this time.  Discussed the nonoperative management of knee osteoarthritis, including physical therapy, anti-inflammatories, and injections.  Patient was given referral for physical therapy.  Patient take over-the-counter anti-inflammatories as needed.  Patient would like a left knee corticosteroid injection today.  Discussed the risks of corticosteroid injections.  Patient was given a left knee corticosteroid injection using aseptic technique.  Patient tolerated procedure well.  Discussed following up with bariatrics.  Patient has started to see bariatrics at [facility].  Discussed that patient can continue to follow-up with bariatrics at [facility].  Patient inquired about the [facility] bariatrics program.  Patient was referred to Dr. Kyra Meeks. Patient will follow-up in 3 months for reevaluation and management.  Patient understanding and in agreement the plan.  All questions answered \par \par \par Plan:\par -Left Knee Corticosteroid Injection given\par -Physical therapy\par -Over-the-counter anti-inflammatories as needed\par -Follow-up with bariatrics at [facility] or with Dr. Kyra Meeks\par -Follow-up in 3 months for reevaluation and management\par \par \par \par Procedure Note: Left knee corticosteroid injection\par \par A Left Knee knee corticosteroid injection was given today. Risk and benefits of the injection were discussed, including but not limited to infection, fat atrophy, skin discoloration, failure to achieve desired results and elevated blood sugars. \par The area was prepped in the usual sterile fashion. The injection was given with 1 cc (40 mg) Methylprednisolone and 4 cc 1% Lidocaine and the patient tolerated it well. \par \par Risk of cortisone injection are minimal but present. There is the rare risk of joint infection, skin and soft tissue thinning or whitening of the skin surrounding the injection site, thinning of nearby bone, or the risk of elevated blood glucose in diabetics. Diabetics receiving steroid injection should follow their blood sugars very closely for several days after receiving the injections.  In the event of uncontrolled elevated blood glucose, they should seek medical attention.\par \par There's some concern that repeated use of cortisone shots may cause deterioration of the cartilage within a joint. For this reason, doctors typically limit the number of cortisone shots in a joint. The limit varies depending on the joint and the reason for treatment.\par \par Cortisone shots usually include a corticosteroid medication and a local anesthetic. The local anesthetic helps to numb the joint at the time of the injection and last for several hours. The cortisone acts to relieve pain and inflammation but may take several days to begin to work. Some people do experience a cortisone flare after the injection and may have increased pain for 2 to 3 days after the injection, and then pain can begin to improve.\par \par Patient was instructed to call or return for any signs or symptoms of infection after an injection including increased pain, swelling, redness or fevers.

## 2023-05-24 RX ORDER — HYLAN G-F 20 16MG/2ML
16 SYRINGE (ML) INTRAARTICULAR
Qty: 2 | Refills: 0 | Status: ACTIVE | COMMUNITY
Start: 2023-05-24 | End: 1900-01-01

## 2023-05-25 ENCOUNTER — NON-APPOINTMENT (OUTPATIENT)
Age: 43
End: 2023-05-25

## 2023-05-25 RX ORDER — CELECOXIB 200 MG/1
200 CAPSULE ORAL DAILY
Qty: 14 | Refills: 1 | Status: ACTIVE | COMMUNITY
Start: 2023-05-25 | End: 1900-01-01

## 2023-06-22 ENCOUNTER — APPOINTMENT (OUTPATIENT)
Dept: ORTHOPEDIC SURGERY | Facility: CLINIC | Age: 43
End: 2023-06-22
Payer: COMMERCIAL

## 2023-06-22 DIAGNOSIS — M17.0 BILATERAL PRIMARY OSTEOARTHRITIS OF KNEE: ICD-10-CM

## 2023-06-22 PROCEDURE — 99213 OFFICE O/P EST LOW 20 MIN: CPT | Mod: 25

## 2023-06-22 PROCEDURE — 20610 DRAIN/INJ JOINT/BURSA W/O US: CPT | Mod: 50

## 2023-06-22 RX ORDER — CELECOXIB 200 MG/1
200 CAPSULE ORAL DAILY
Qty: 14 | Refills: 1 | Status: ACTIVE | COMMUNITY
Start: 2023-06-22 | End: 1900-01-01

## 2023-06-22 RX ORDER — HYLAN G-F 20 16MG/2ML
48 SYRINGE (ML) INTRAARTICULAR
Qty: 2 | Refills: 0 | Status: ACTIVE | COMMUNITY
Start: 2023-06-22

## 2023-07-03 PROBLEM — M17.0 PRIMARY OSTEOARTHRITIS OF BOTH KNEES: Status: ACTIVE | Noted: 2021-11-23

## 2023-07-03 NOTE — PHYSICAL EXAM
[de-identified] : Constitutional:  42 year old female, alert and oriented, cooperative, in no acute distress.\par \par HEENT \par NC/AT.  Appearance: symmetric\par \par Neck/Back\par Straight without deformity or instability.  Good ROM.\par \par Chest/Respiratory \par Respiratory effort: no intercostal retractions or use of accessory muscles. Nonlabored Breathing\par \par Skin \par On inspection, warm and dry without rashes or lesions.\par \par Mental Status: \par Judgment, insight: intact\par Orientation: oriented to time, place, and person\par \par Neurological:\par Sensory and Motor are grossly intact throughout\par \par Left Knee\par \par Inspection:\par     Skin intact, no rashes or lesions\par     No Effusion\par     Non-tender to palpation over tibial tubercle, patella, lateral joint line, and pes insertion.\par     Tenderness over the medial knee (midcoronal line)\par \par Range of Motion:\par 	Extension - 0 degrees\par 	Flexion - 110 degrees\par 	Extensor lag: None\par \par Stability:\par      Demonstrates no Varus or Valgus instability\par      Negative Anterior or Posterior drawer.\par      Negative Lachman's\par \par Patella: stable, tracks well. \par \par Right Knee\par \par Inspection:\par     Skin intact, no rashes or lesions\par     No Effusion\par     Non-tender to palpation over tibial tubercle, patella, medial and lateral joint line, and pes insertion.\par \par Range of Motion:\par 	Extension - 0 degrees\par 	Flexion - 110 degrees\par 	Extensor lag: None\par \par Stability:\par      Demonstrates no Varus or Valgus instability\par      Negative Anterior or Posterior drawer.\par      Negative Lachman's\par \par Patella: stable, tracks well. \par \par Neurologic Exam\par     Motor intact including 5/5 Extensor Hallucis Longus, 5/5 Flexor Hallucis Longus, 5/5 Tibialis Anterior and 5/5 Gastrocnemius\par     Sensation Intact to Light Touch including Saphenous, Sural, Superficial Peroneal, Deep Peroneal, Tibial nerve distributions\par \par Vascular Exam\par     Foot is warm and well perfused with 2+ Dorsalis Pedis Pulse \par \par No pain with range of motion of the bilateral hips. No lumbar paraspinal muscle tenderness.  [de-identified] : XRay:  XRays of the Pelvis (1 View) taken on 11/17/2023. XRays demonstrate no obvious fracture or dislocation. There is joint space narrowing in the bilateral hips. (my personal interpretation).\par \par XRay: XRays of the Right Knee (4 Views) taken on 3/16/2023. XRays demonstrate tricompartmental joint space narrowing with subchondral sclerosis, overlying osteophytes, all consistent with severe osteoarthritis, KL Grade: 3. (my personal interpretation) \par \par XRay: XRays of the Left Knee (4 Views) taken on 3/16/2023. XRays demonstrate tricompartmental joint space narrowing with subchondral sclerosis, overlying osteophytes, all consistent with severe osteoarthritis, KL Grade: 3. (my personal interpretation)

## 2023-07-03 NOTE — HISTORY OF PRESENT ILLNESS
[de-identified] : 6/22/2023\par \par Yolanda Olsen is a 42-year-old female who presented to the office for follow-up of her bilateral knee pain.  Patient continues to have bilateral knee pain.  She states that she has been more active lately and has been experiencing knee pain.  She has not been in physical therapy, but would like to start again.  She has tried ibuprofen, with some relief.  Left knee injection worked for a period of time, but she started having pain again last month.  Left knee pain is worse at night.  Pain is located over the lateral knee bilaterally.\par \par 3/16/2023:\par Patient states that injections helped with her right knee, but she is starting to have left knee pain once again.  She states that she has been walking and moving around more.  Pain is located over the left lateral knee.  She states that the right knee pain is improved, but has some occasional discomfort.  No fevers or chills.\par \par \par Ms. Olsen is a 42-year-old female presented to the office for evaluation of her bilateral knee pain.  Patient has been experiencing bilateral knee pain since 2016.  She states that she used to work for [employer] and now works for [employer], which causes her to sit more and be less active.  She states that her knee gave out in December 2016 and has experienced knee pain since that time.  Pain is worse with standing and with walking longer distances.  Pain is mostly located over the lateral knees and anterior thighs.  She has tried physical therapy with some relief.  She has tried Advil with some relief.  Patient was previously seen by Dr. Alvarado and received a corticosteroid injection in 2021, which lasted about 3 to 4 months.  She had a viscosupplementation injection in April 2022, which lasted about 7 to 8 months.  She states that her pain has returned over the last few weeks.  No hip or groin pain.  No numbness or tingling.  No recent trauma.  No fevers or chills.  Patient states that if she is looking into bariatric surgery at this time.\par \par \par History: Anemia, HTN

## 2023-07-03 NOTE — DISCUSSION/SUMMARY
[de-identified] : Ms. Olsen is a 42-year-old female who presents the office for follow-up of her bilateral knee pain.  Patient was previously seen in the office and given knee corticosteroid injections. Prior X-rays showed significant bilateral knee osteoarthritis.  Examination showed good knee range of motion. Discussed with the patient the examination and imaging findings.  Discussed with the patient the operative and nonoperative management of knee osteoarthritis, including total knee arthroplasty.  Patient would like to continue nonoperative management at this time.  Discussed the nonoperative management of knee osteoarthritis, including physical therapy, anti-inflammatories, and injections.  Patient was given referral for physical therapy.  She was given a prescription for Celebrex 200 mg daily twice per day.  Patient would like bilateral knee viscosupplementation injections.  Discussed the risks of viscosupplementation injections, including that injections may not provide relief.  Bilateral knee Synvisc One injections were given using aseptic technique.  Patient tolerated the procedure well.. Patient will follow-up in 3 months for reevaluation and management.  Patient understanding and in agreement the plan.  All questions answered \par \par \par Plan:\par -Bilateral knee Synvisc One injections given\par -Physical therapy\par -Celebrex 200 mg daily for 14 days\par -Follow-up in 3 months for reevaluation and management\par \par \par \par Procedure Note: Bilateral knee Synvisc One injections\par Right: \par Exp: 2/28/2026\par LOT: [id number]\par \par Left: \par Exp: 2/28/2026\par LOT: [id number]\par \par Bilateral knee injections were given today. Risk and benefits of the injection were discussed, including but not limited to infection and failure to achieve desired results.\par The area was prepped in the usual sterile fashion. Bilateral Knee Synvisc One Injections were given using aseptic technique. The patient tolerated the procedure well.\par \par Patient was instructed to call or return for any signs or symptoms of infection after an injection including increased pain, swelling, redness or fevers.

## 2023-08-14 ENCOUNTER — INPATIENT (INPATIENT)
Facility: HOSPITAL | Age: 43
LOS: 1 days | Discharge: HOME | End: 2023-08-16
Attending: INTERNAL MEDICINE | Admitting: INTERNAL MEDICINE
Payer: COMMERCIAL

## 2023-08-14 VITALS
SYSTOLIC BLOOD PRESSURE: 153 MMHG | DIASTOLIC BLOOD PRESSURE: 96 MMHG | RESPIRATION RATE: 22 BRPM | TEMPERATURE: 98 F | WEIGHT: 293 LBS | HEART RATE: 80 BPM | OXYGEN SATURATION: 96 %

## 2023-08-14 DIAGNOSIS — D21.9 BENIGN NEOPLASM OF CONNECTIVE AND OTHER SOFT TISSUE, UNSPECIFIED: Chronic | ICD-10-CM

## 2023-08-14 LAB
ALBUMIN SERPL ELPH-MCNC: 3.8 G/DL — SIGNIFICANT CHANGE UP (ref 3.3–5)
ALP SERPL-CCNC: 52 U/L — SIGNIFICANT CHANGE UP (ref 40–120)
ALT FLD-CCNC: 16 U/L — SIGNIFICANT CHANGE UP (ref 4–33)
ANION GAP SERPL CALC-SCNC: 15 MMOL/L — HIGH (ref 7–14)
APTT BLD: 27.1 SEC — SIGNIFICANT CHANGE UP (ref 24.5–35.6)
AST SERPL-CCNC: 45 U/L — HIGH (ref 4–32)
B PERT DNA SPEC QL NAA+PROBE: SIGNIFICANT CHANGE UP
B PERT+PARAPERT DNA PNL SPEC NAA+PROBE: SIGNIFICANT CHANGE UP
BASOPHILS # BLD AUTO: 0.02 K/UL — SIGNIFICANT CHANGE UP (ref 0–0.2)
BASOPHILS NFR BLD AUTO: 0.3 % — SIGNIFICANT CHANGE UP (ref 0–2)
BILIRUB SERPL-MCNC: 0.7 MG/DL — SIGNIFICANT CHANGE UP (ref 0.2–1.2)
BORDETELLA PARAPERTUSSIS (RAPRVP): SIGNIFICANT CHANGE UP
BUN SERPL-MCNC: 7 MG/DL — SIGNIFICANT CHANGE UP (ref 7–23)
C PNEUM DNA SPEC QL NAA+PROBE: SIGNIFICANT CHANGE UP
CALCIUM SERPL-MCNC: 9.1 MG/DL — SIGNIFICANT CHANGE UP (ref 8.4–10.5)
CHLORIDE SERPL-SCNC: 103 MMOL/L — SIGNIFICANT CHANGE UP (ref 98–107)
CO2 SERPL-SCNC: 19 MMOL/L — LOW (ref 22–31)
CREAT SERPL-MCNC: 0.57 MG/DL — SIGNIFICANT CHANGE UP (ref 0.5–1.3)
D DIMER BLD IA.RAPID-MCNC: 544 NG/ML DDU — HIGH
EGFR: 114 ML/MIN/1.73M2 — SIGNIFICANT CHANGE UP
EOSINOPHIL # BLD AUTO: 0.14 K/UL — SIGNIFICANT CHANGE UP (ref 0–0.5)
EOSINOPHIL NFR BLD AUTO: 2.1 % — SIGNIFICANT CHANGE UP (ref 0–6)
FLUAV SUBTYP SPEC NAA+PROBE: SIGNIFICANT CHANGE UP
FLUBV RNA SPEC QL NAA+PROBE: SIGNIFICANT CHANGE UP
GLUCOSE SERPL-MCNC: 97 MG/DL — SIGNIFICANT CHANGE UP (ref 70–99)
HADV DNA SPEC QL NAA+PROBE: SIGNIFICANT CHANGE UP
HCG SERPL-ACNC: <1 MIU/ML — SIGNIFICANT CHANGE UP
HCOV 229E RNA SPEC QL NAA+PROBE: SIGNIFICANT CHANGE UP
HCOV HKU1 RNA SPEC QL NAA+PROBE: SIGNIFICANT CHANGE UP
HCOV NL63 RNA SPEC QL NAA+PROBE: SIGNIFICANT CHANGE UP
HCOV OC43 RNA SPEC QL NAA+PROBE: SIGNIFICANT CHANGE UP
HCT VFR BLD CALC: 30.7 % — LOW (ref 34.5–45)
HGB BLD-MCNC: 10.4 G/DL — LOW (ref 11.5–15.5)
HMPV RNA SPEC QL NAA+PROBE: SIGNIFICANT CHANGE UP
HPIV1 RNA SPEC QL NAA+PROBE: SIGNIFICANT CHANGE UP
HPIV2 RNA SPEC QL NAA+PROBE: SIGNIFICANT CHANGE UP
HPIV3 RNA SPEC QL NAA+PROBE: SIGNIFICANT CHANGE UP
HPIV4 RNA SPEC QL NAA+PROBE: SIGNIFICANT CHANGE UP
IANC: 3.98 K/UL — SIGNIFICANT CHANGE UP (ref 1.8–7.4)
IMM GRANULOCYTES NFR BLD AUTO: 0.3 % — SIGNIFICANT CHANGE UP (ref 0–0.9)
INR BLD: 1.16 RATIO — SIGNIFICANT CHANGE UP (ref 0.85–1.18)
LIDOCAIN IGE QN: 25 U/L — SIGNIFICANT CHANGE UP (ref 7–60)
LYMPHOCYTES # BLD AUTO: 2.39 K/UL — SIGNIFICANT CHANGE UP (ref 1–3.3)
LYMPHOCYTES # BLD AUTO: 35.1 % — SIGNIFICANT CHANGE UP (ref 13–44)
M PNEUMO DNA SPEC QL NAA+PROBE: SIGNIFICANT CHANGE UP
MAGNESIUM SERPL-MCNC: 2 MG/DL — SIGNIFICANT CHANGE UP (ref 1.6–2.6)
MCHC RBC-ENTMCNC: 33.9 GM/DL — SIGNIFICANT CHANGE UP (ref 32–36)
MCHC RBC-ENTMCNC: 39.4 PG — HIGH (ref 27–34)
MCV RBC AUTO: 116.3 FL — HIGH (ref 80–100)
MONOCYTES # BLD AUTO: 0.26 K/UL — SIGNIFICANT CHANGE UP (ref 0–0.9)
MONOCYTES NFR BLD AUTO: 3.8 % — SIGNIFICANT CHANGE UP (ref 2–14)
NEUTROPHILS # BLD AUTO: 3.98 K/UL — SIGNIFICANT CHANGE UP (ref 1.8–7.4)
NEUTROPHILS NFR BLD AUTO: 58.4 % — SIGNIFICANT CHANGE UP (ref 43–77)
NRBC # BLD: 0 /100 WBCS — SIGNIFICANT CHANGE UP (ref 0–0)
NRBC # FLD: 0 K/UL — SIGNIFICANT CHANGE UP (ref 0–0)
NT-PROBNP SERPL-SCNC: 51 PG/ML — SIGNIFICANT CHANGE UP
PLATELET # BLD AUTO: 277 K/UL — SIGNIFICANT CHANGE UP (ref 150–400)
POTASSIUM SERPL-MCNC: 5.1 MMOL/L — SIGNIFICANT CHANGE UP (ref 3.5–5.3)
POTASSIUM SERPL-SCNC: 5.1 MMOL/L — SIGNIFICANT CHANGE UP (ref 3.5–5.3)
PROT SERPL-MCNC: 7.7 G/DL — SIGNIFICANT CHANGE UP (ref 6–8.3)
PROTHROM AB SERPL-ACNC: 12.9 SEC — SIGNIFICANT CHANGE UP (ref 9.5–13)
RAPID RVP RESULT: SIGNIFICANT CHANGE UP
RBC # BLD: 2.64 M/UL — LOW (ref 3.8–5.2)
RBC # FLD: 15.9 % — HIGH (ref 10.3–14.5)
RSV RNA SPEC QL NAA+PROBE: SIGNIFICANT CHANGE UP
RV+EV RNA SPEC QL NAA+PROBE: SIGNIFICANT CHANGE UP
SARS-COV-2 RNA SPEC QL NAA+PROBE: SIGNIFICANT CHANGE UP
SODIUM SERPL-SCNC: 137 MMOL/L — SIGNIFICANT CHANGE UP (ref 135–145)
TROPONIN T, HIGH SENSITIVITY RESULT: <6 NG/L — SIGNIFICANT CHANGE UP
WBC # BLD: 6.81 K/UL — SIGNIFICANT CHANGE UP (ref 3.8–10.5)
WBC # FLD AUTO: 6.81 K/UL — SIGNIFICANT CHANGE UP (ref 3.8–10.5)

## 2023-08-14 PROCEDURE — 99223 1ST HOSP IP/OBS HIGH 75: CPT

## 2023-08-14 PROCEDURE — 71046 X-RAY EXAM CHEST 2 VIEWS: CPT | Mod: 26

## 2023-08-14 PROCEDURE — 71275 CT ANGIOGRAPHY CHEST: CPT | Mod: 26,MA

## 2023-08-14 RX ORDER — ACETAMINOPHEN 500 MG
650 TABLET ORAL ONCE
Refills: 0 | Status: COMPLETED | OUTPATIENT
Start: 2023-08-14 | End: 2023-08-14

## 2023-08-14 RX ORDER — APIXABAN 2.5 MG/1
10 TABLET, FILM COATED ORAL ONCE
Refills: 0 | Status: COMPLETED | OUTPATIENT
Start: 2023-08-14 | End: 2023-08-14

## 2023-08-14 RX ORDER — HEPARIN SODIUM 5000 [USP'U]/ML
10000 INJECTION INTRAVENOUS; SUBCUTANEOUS EVERY 6 HOURS
Refills: 0 | Status: DISCONTINUED | OUTPATIENT
Start: 2023-08-14 | End: 2023-08-14

## 2023-08-14 RX ORDER — AMLODIPINE BESYLATE 2.5 MG/1
5 TABLET ORAL ONCE
Refills: 0 | Status: COMPLETED | OUTPATIENT
Start: 2023-08-14 | End: 2023-08-14

## 2023-08-14 RX ORDER — HEPARIN SODIUM 5000 [USP'U]/ML
10000 INJECTION INTRAVENOUS; SUBCUTANEOUS ONCE
Refills: 0 | Status: DISCONTINUED | OUTPATIENT
Start: 2023-08-14 | End: 2023-08-14

## 2023-08-14 RX ORDER — HEPARIN SODIUM 5000 [USP'U]/ML
INJECTION INTRAVENOUS; SUBCUTANEOUS
Qty: 25000 | Refills: 0 | Status: DISCONTINUED | OUTPATIENT
Start: 2023-08-14 | End: 2023-08-14

## 2023-08-14 RX ORDER — HEPARIN SODIUM 5000 [USP'U]/ML
5000 INJECTION INTRAVENOUS; SUBCUTANEOUS EVERY 6 HOURS
Refills: 0 | Status: DISCONTINUED | OUTPATIENT
Start: 2023-08-14 | End: 2023-08-14

## 2023-08-14 RX ORDER — APIXABAN 2.5 MG/1
10 TABLET, FILM COATED ORAL EVERY 12 HOURS
Refills: 0 | Status: DISCONTINUED | OUTPATIENT
Start: 2023-08-14 | End: 2023-08-16

## 2023-08-14 RX ADMIN — Medication 650 MILLIGRAM(S): at 23:26

## 2023-08-14 RX ADMIN — APIXABAN 10 MILLIGRAM(S): 2.5 TABLET, FILM COATED ORAL at 15:25

## 2023-08-14 RX ADMIN — AMLODIPINE BESYLATE 5 MILLIGRAM(S): 2.5 TABLET ORAL at 17:12

## 2023-08-14 RX ADMIN — Medication 650 MILLIGRAM(S): at 22:26

## 2023-08-14 NOTE — ED CDU PROVIDER INITIAL DAY NOTE - OBJECTIVE STATEMENT
Patient is a 43 year-old-female with history of hypertension on amlodipine presents with shortness of breath. Reports that she was diagnosed with COVID on 7/25 when she congestion and coughing. Was prescribed antiviral and cough suppressant initially and felt improved. However coughing worsen in early August and was prescribed albuterol/steroids/antibiotics. About 4 days ago, started to have shortness of breath, associated with exertion w/mild chest discomfort. Denies fever at home, vomiting, abdominal pain, urinary or bowel complaints.    CDU: agreed with above; in the ED, CT positive for PE b/l. vital stable. dispo to CDU for echo to r/o right heart strain and teledoc f/u and tele monitor.

## 2023-08-14 NOTE — ED ADULT TRIAGE NOTE - CHIEF COMPLAINT QUOTE
Pt arrives ambulatory to triage c/o worsening SOB since being dx'd with covid on 7/25. PALMER noted, 3L NC noted. Also c/o chest pain. PMHx: anemia, HTN.

## 2023-08-14 NOTE — ED ADULT NURSE NOTE - OBJECTIVE STATEMENT
pt A&ox4, coming to ED from home for SOB since last week. pt states she was diagnosed with Covid on 7/25, symptoms improved but this past week she has started to become SOB with a dry cough. past medical history: Anemia. denies anticoagulation use. pt denies Chest pain. pt denies H/A , Dizziness , lightheadedness , and radiating chest pain. NSR on telemetry breathing is spontaneous and unlabored. sating 99% on RA. right ac 20g IV placed Labs drawn and sent as per ordered. Bed in lowest position, call bell within reach, all other safety and comfort measures provided. awaiting labs results and further orders.

## 2023-08-14 NOTE — ED PROVIDER NOTE - PHYSICAL EXAMINATION
Vitals: O2 sat of 97-99% on room air   General: short of breath on exam, not diaphoretic   HEENT: Atraumatic, normocephalic, airway patent  Eyes: EOMI, tracking appropriately  Neck: no tracheal deviation, no JVD  Chest/Lungs: symmetric chest rise, CTA BL, mild work of breathing   Heart: skin and extremities well perfused, regular rate and rhythm  Abdomen: soft, nontender and nondistended   Neuro: A+Ox3, ambulating without difficulty, CN II-XI intact  MSK: strength at baseline in all extremities, no muscle wasting or atrophy  Skin: no cyanosis, no jaundice, no new emergent lesions

## 2023-08-14 NOTE — ED PROVIDER NOTE - CLINICAL SUMMARY MEDICAL DECISION MAKING FREE TEXT BOX
Patient is a 43 year-old-female with history of hypertension on amlodipine presents with shortness of breath. Exam remarkable for dyspnea but lung sounds clear bilaterally. O2 sat of 97-99% on RA. DDx includes but not limited to pneumonia, PE or pulmonary edema. Workup includes labs, trop, bnp, d-dimer, ECG and CXR.

## 2023-08-14 NOTE — ED PROVIDER NOTE - OBJECTIVE STATEMENT
Patient is a 43 year-old-female with history of hypertension on amlodipine presents with shortness of breath. Reports that she was diagnosed with COVID on 7/25 when she congestion and coughing. Was prescribed antiviral and cough suppressant initially and felt improved. However coughing worsen in early August and was prescribed albuterol/steroids/antibiotics. About 4 days ago, started to have shortness of breath, associated with exertion w/mild chest discomfort. Denies fever at home, vomiting, abdominal pain, urinary or bowel complaints.

## 2023-08-14 NOTE — ED PROVIDER NOTE - ATTENDING CONTRIBUTION TO CARE
43-year-old female history of hypertension on amlodipine presents with shortness of breath.  Patient was diagnosed with COVID on July 25 had typical symptoms congestion URI symptoms and was given an antiviral and a cough suppressant.  Patient first felt improved but then her cough got worse again early in August and was given albuterol and steroids.  Patient said the cough has improved the last 4 days has been having extreme shortness of breath especially with any exertion.  States sometimes associated with mild chest pain.  Patient denies fevers.  Patient is a bit dyspneic however is not tachycardic or hypoxic and her lungs are clear.  Her oxygen saturations are 98 to 100% on room air.  We will rule out pneumonia rule out PE pulmonary edema labs x-ray troponin proBNP and dimer and reassess.

## 2023-08-14 NOTE — ED CDU PROVIDER INITIAL DAY NOTE - NS ED ATTENDING STATEMENT MOD
This was a shared visit with the U[facility]. I reviewed and verified the documentation and independently performed the documented:

## 2023-08-14 NOTE — ED CDU PROVIDER INITIAL DAY NOTE - CLINICAL SUMMARY MEDICAL DECISION MAKING FREE TEXT BOX
pt presented to the ED for SOB x 1 week. CT showed B/L PE. pt dispo to CDU for echo and tele monitor and tele doc f/u. pt presented to the ED for SOB x 1 week. CT showed B/L PE. pt dispo to CDU for eliquis, echo and tele monitor and tele doc f/u.

## 2023-08-15 DIAGNOSIS — Z98.891 HISTORY OF UTERINE SCAR FROM PREVIOUS SURGERY: Chronic | ICD-10-CM

## 2023-08-15 DIAGNOSIS — Z29.9 ENCOUNTER FOR PROPHYLACTIC MEASURES, UNSPECIFIED: ICD-10-CM

## 2023-08-15 DIAGNOSIS — I26.99 OTHER PULMONARY EMBOLISM WITHOUT ACUTE COR PULMONALE: ICD-10-CM

## 2023-08-15 DIAGNOSIS — I10 ESSENTIAL (PRIMARY) HYPERTENSION: ICD-10-CM

## 2023-08-15 DIAGNOSIS — F41.9 ANXIETY DISORDER, UNSPECIFIED: ICD-10-CM

## 2023-08-15 LAB
ANION GAP SERPL CALC-SCNC: 11 MMOL/L — SIGNIFICANT CHANGE UP (ref 7–14)
BUN SERPL-MCNC: 7 MG/DL — SIGNIFICANT CHANGE UP (ref 7–23)
CALCIUM SERPL-MCNC: 9.3 MG/DL — SIGNIFICANT CHANGE UP (ref 8.4–10.5)
CHLORIDE SERPL-SCNC: 104 MMOL/L — SIGNIFICANT CHANGE UP (ref 98–107)
CO2 SERPL-SCNC: 24 MMOL/L — SIGNIFICANT CHANGE UP (ref 22–31)
CREAT SERPL-MCNC: 0.66 MG/DL — SIGNIFICANT CHANGE UP (ref 0.5–1.3)
EGFR: 110 ML/MIN/1.73M2 — SIGNIFICANT CHANGE UP
GLUCOSE SERPL-MCNC: 119 MG/DL — HIGH (ref 70–99)
HCT VFR BLD CALC: 30.9 % — LOW (ref 34.5–45)
HGB BLD-MCNC: 11 G/DL — LOW (ref 11.5–15.5)
MAGNESIUM SERPL-MCNC: 1.8 MG/DL — SIGNIFICANT CHANGE UP (ref 1.6–2.6)
MCHC RBC-ENTMCNC: 35.6 GM/DL — SIGNIFICANT CHANGE UP (ref 32–36)
MCHC RBC-ENTMCNC: 40.7 PG — HIGH (ref 27–34)
MCV RBC AUTO: 114.4 FL — HIGH (ref 80–100)
NRBC # BLD: 0 /100 WBCS — SIGNIFICANT CHANGE UP (ref 0–0)
NRBC # FLD: 0 K/UL — SIGNIFICANT CHANGE UP (ref 0–0)
PHOSPHATE SERPL-MCNC: 3.8 MG/DL — SIGNIFICANT CHANGE UP (ref 2.5–4.5)
PLATELET # BLD AUTO: 290 K/UL — SIGNIFICANT CHANGE UP (ref 150–400)
POTASSIUM SERPL-MCNC: 3.6 MMOL/L — SIGNIFICANT CHANGE UP (ref 3.5–5.3)
POTASSIUM SERPL-SCNC: 3.6 MMOL/L — SIGNIFICANT CHANGE UP (ref 3.5–5.3)
RBC # BLD: 2.7 M/UL — LOW (ref 3.8–5.2)
RBC # FLD: 15.1 % — HIGH (ref 10.3–14.5)
SODIUM SERPL-SCNC: 139 MMOL/L — SIGNIFICANT CHANGE UP (ref 135–145)
WBC # BLD: 7.23 K/UL — SIGNIFICANT CHANGE UP (ref 3.8–10.5)
WBC # FLD AUTO: 7.23 K/UL — SIGNIFICANT CHANGE UP (ref 3.8–10.5)

## 2023-08-15 PROCEDURE — 93970 EXTREMITY STUDY: CPT | Mod: 26

## 2023-08-15 PROCEDURE — 99223 1ST HOSP IP/OBS HIGH 75: CPT

## 2023-08-15 PROCEDURE — 93010 ELECTROCARDIOGRAM REPORT: CPT

## 2023-08-15 PROCEDURE — 99233 SBSQ HOSP IP/OBS HIGH 50: CPT

## 2023-08-15 PROCEDURE — 93306 TTE W/DOPPLER COMPLETE: CPT | Mod: 26

## 2023-08-15 RX ORDER — AMLODIPINE BESYLATE 2.5 MG/1
1 TABLET ORAL
Refills: 0 | DISCHARGE

## 2023-08-15 RX ORDER — AMLODIPINE BESYLATE 2.5 MG/1
5 TABLET ORAL DAILY
Refills: 0 | Status: DISCONTINUED | OUTPATIENT
Start: 2023-08-15 | End: 2023-08-16

## 2023-08-15 RX ADMIN — APIXABAN 10 MILLIGRAM(S): 2.5 TABLET, FILM COATED ORAL at 02:18

## 2023-08-15 RX ADMIN — APIXABAN 10 MILLIGRAM(S): 2.5 TABLET, FILM COATED ORAL at 21:47

## 2023-08-15 RX ADMIN — AMLODIPINE BESYLATE 5 MILLIGRAM(S): 2.5 TABLET ORAL at 14:42

## 2023-08-15 RX ADMIN — APIXABAN 10 MILLIGRAM(S): 2.5 TABLET, FILM COATED ORAL at 10:33

## 2023-08-15 NOTE — H&P ADULT - PROBLEM SELECTOR PROBLEM 1
Yue Coe is a 69year old female.     HPI:     HPI     Diabetic Eye Exam     Diabetes Type: Type 2, controlled with diet and taking oral medications    Duration: 12 years    Number of years diabetic: 12    Number of years on pills: 12    Number of (FATTY LIVER) (2 BIOPSIES IN THE PAST).     Family History   Problem Relation Age of Onset   • Cancer Mother         OVARIAN AND COLON   • Diabetes Mother    • Ovarian Cancer Mother 72   • Cancer Maternal Grandmother         BREAST CANCER,   • Breast Cancer ER Beads 360 MG Oral Capsule SR 24 Hr Take 1 capsule (360 mg total) by mouth daily.  90 capsule 1   • Levothyroxine Sodium 88 MCG Oral Tab Take 1 tablet (88 mcg total) by mouth every morning before breakfast. 90 tablet 1   • glimepiride 4 MG Oral Tab Take 1 Normal    Cornea Clear Clear    Anterior Chamber Deep and quiet Deep and quiet    Iris Normal Normal    Lens 1+ Nuclear sclerosis,  2+ Cortical cataract 1+ Nuclear sclerosis, 2+ Cortical cataract    Vitreous Central vitreous floater Central vitreous floate Acute pulmonary embolism

## 2023-08-15 NOTE — H&P ADULT - ASSESSMENT
44 yo F with h/o HTN, OA to knees  s/p gell injection by orthopedics, Anxiety / Depression on no mediation---> Sees a therapist , was in [facility] until 7/25/23 when she was diagnosed with Covid and was treated with Albuterol/Azithro/Lagevrio and Benzonatate cough tablets.  She noted she used marijuana and vodka on 8/10/23 and noted SOB , more yesterday so she came to ED and found to have Pulmonary embolism, started on Eliquis  She was observed in CDU , but admitted for more observation      Recent h/o Covid  7/25/23. --> Sat 98 5 on RA  Observe, no further treatment needed.

## 2023-08-15 NOTE — ED CDU PROVIDER SUBSEQUENT DAY NOTE - CLINICAL SUMMARY MEDICAL DECISION MAKING FREE TEXT BOX
pt presented to the ED for SOB x 1 week. CT showed B/L PE. pt dispo to CDU for eliquis, echo and tele monitor and tele doc f/u.

## 2023-08-15 NOTE — H&P ADULT - PROBLEM SELECTOR PLAN 1
C/w Eliquis 10 mg q12  Patient to f/u with PCP Dr Arnold Nurse out patient in 1 week    Will get LE doppler to r/o DVT C/w Eliquis 10 mg q12  Patient to f/u with PCP Dr Arnold Nurse out patient in 1 week    Will get LE doppler to r/o DVT    ech< from: Transthoracic Echocardiogram (08.15.23 @ 06:32) >  CONCLUSIONS:  EF 51 %  1. Normal left ventricular systolic function. No segmental  wall motion abnormalities. GLS -18% (normal).  2. Normal right ventricular size and function.

## 2023-08-15 NOTE — H&P ADULT - PROBLEM SELECTOR PLAN 4
Already on Eliquis for PE    No NSAIDS/ no extreme sports.  Patient counselled  Obese --> rec nutritionist  for Wt loss diet.. Already on Eliquis for PE    No NSAIDS/ no extreme sports.  Patient counselled  Obese --> rec nutritionist  for Wt loss diet..    Dispo  Most likely home 8/16/23 , plan d/w patient and PA

## 2023-08-15 NOTE — ED CDU PROVIDER SUBSEQUENT DAY NOTE - HISTORY
43 year-old-female with history of hypertension on amlodipine presents with shortness of breath. Reports that she was diagnosed with COVID on 7/25 with improvement of symptoms however SOB x 4 days and exertional CP. In the ED, CT positive for PE b/l. vital stable. dispo to CDU for echo to r/o right heart strain and teledoc f/u and tele monitor. Patient reported SOB overnight, stable vitals, 2L NC started with immediate improvement of symptoms, EKG, non ischemic

## 2023-08-15 NOTE — H&P ADULT - NSHPPHYSICALEXAM_GEN_ALL_CORE
Vital Signs Last 24 Hrs  T(C): 36.9 (15 Aug 2023 10:52), Max: 37.2 (14 Aug 2023 21:38)  T(F): 98.4 (15 Aug 2023 10:52), Max: 98.9 (14 Aug 2023 21:38)  HR: 82 (15 Aug 2023 10:52) (74 - 90)  BP: 150/86 (RR: 18 SpO2: 98% room air      PHYSICAL EXAM:  GENERAL: NAD, Obese  HEAD:  Atraumatic, Normocephalic  EYES: EOMI, PERRLA, conjunctiva and sclera clear  NECK: Supple, no JVD  CHEST/LUNG: Clear to auscultation bilaterally; no wheeze  HEART: Regular rate and rhythm; no murmurs, rubs, or gallops  ABDOMEN: Soft, Nontender, Nondistended; Bowel sounds present  EXTREMITIES:  warm and well perfused, no clubbing, cyanosis, or edema  PSYCH: AAOx3  NEUROLOGY: non-focal  SKIN: no rashes or lesions

## 2023-08-15 NOTE — H&P ADULT - HISTORY OF PRESENT ILLNESS
44 yo F with h/o HTN, OA to knees  s/p gell injection by orthopedics, Anxiety / Depression on no mediation---> Sees a therapist , was in [facility] until 7/25/23 when she was diagnosed with Covid and was treated with Albuterol/Azithro/Lagevrio and Benzonatate cough tablets.  She noted she used marijuana and vodka on 8/10/23 and noted SOB , more yesterday so she came to ED and found to have Pulmonary embolism, started on Eliquis.  Denies recent travel or prolong car rides  She was observed in CDU , but admitted for more observation

## 2023-08-15 NOTE — H&P ADULT - NSHPREVIEWOFSYSTEMS_GEN_ALL_CORE
CONSTITUTIONAL: No fever, weight loss, or fatigue  EYES: No eye pain, visual disturbances, or discharge  ENMT:  No difficulty hearing, tinnitus, vertigo; No sinus or throat pain  NECK: No pain or stiffness  BREASTS: No pain, masses, or nipple discharge  RESPIRATORY: POSITIVE  shortness of breath  CARDIOVASCULAR: No chest pain, palpitations, dizziness, or leg swelling  GASTROINTESTINAL: No abdominal or epigastric pain. No nausea, vomiting, or hematemesis; No diarrhea or constipation. No melena or hematochezia.  GENITOURINARY: No dysuria, frequency, hematuria, or incontinence  NEUROLOGICAL: No headaches, memory loss, loss of strength, numbness, or tremors  SKIN: No itching, burning, rashes, or lesions   LYMPH NODES: No enlarged glands  ENDOCRINE: No heat or cold intolerance; No hair loss  MUSCULOSKELETAL: No muscle or back pain  PSYCHIATRIC: No depression, anxiety, mood swings, or difficulty sleeping  HEME/LYMPH: No easy bruising, or bleeding gums  ALLERGY AND IMMUNOLOGIC: No hives or eczema

## 2023-08-15 NOTE — ED CDU PROVIDER DISPOSITION NOTE - CLINICAL COURSE
43-year-old female with past medical history of COVID July 25, hypertension presents to ED complaining of shortness of breath for 4 days.  Found with bilateral PE.  Started on Eliquis and sent to CDU for echocardiogram limits.  Patient satting well on room air and with ambulation 95%.  However patient in SOB while in bed at rest not well appearing.  Plan to admit for overnight monitoring and A/C. 43-year-old female with past medical history of COVID July 25, hypertension presents to ED complaining of shortness of breath for 4 days.  Found with bilateral PE.  Started on Eliquis and sent to CDU for echocardiogram limits.  Patient satting well on room air and with ambulation 95%.  However patient in SOB while in bed at rest not well appearing.  Plan to admit for overnight monitoring and A/C. All results d/w her thus far.

## 2023-08-15 NOTE — ED CDU PROVIDER SUBSEQUENT DAY NOTE - ATTENDING APP SHARED VISIT CONTRIBUTION OF CARE
evaluated patient on am rounds, reports she had sob overnight especially after exerting herself but got better after being placed on o2 temporarily. no longer feeling that way, reports her chest discomfort is improved though still present.   on exam  A & O x 3, NAD, HEENT WNL and no facial asymmetry; lungs CTAB, heart with reg rhythm without murmur; unlabored appearing, abdomen soft NTND; extremities with no edema; neuro exam non focal with no motor or sensory deficits.  a/p-Continue cdu care, a/c, monitoring, echo, cards eval, o2 prn. All results thus far d/w patient and is agreeable.

## 2023-08-15 NOTE — H&P ADULT - NSHPOUTPATIENTPROVIDERS_GEN_ALL_CORE
Pcp Dr Arnold Nurse- [facility] Dr Farrar  [facility] Noelle Martines

## 2023-08-15 NOTE — H&P ADULT - NSHPLABSRESULTS_GEN_ALL_CORE
LABS:                        11.0   7.23  )-----------( 290      ( 15 Aug 2023 12:30 )             30.9     08-15    139  |  104  |  7   ----------------------------<  119<H>  3.6   |  24  |  0.66    Ca    9.3      15 Aug 2023 12:30  Phos  3.8     08-15  Mg     1.80     08-15    TPro  7.7  /  Alb  3.8  /  TBili  0.7  /  DBili  x   /  AST  45<H>  /  ALT  16  /  AlkPhos  52  08-14  PT/INR - ( 14 Aug 2023 11:00 )   PT: 12.9 sec;   INR: 1.16 ratio    PTT - ( 14 Aug 2023 11:00 )  PTT:27.1 sec      EKG:    RADIOLOGY & ADDITIONAL TESTS:  ad< from: CT Angio Chest PE Protocol w/ IV Cont (08.14.23 @ 14:05) >    IMPRESSION:  Bilateral pulmonary emboli.  Enlarged pulmonary artery. Echo recommended for complete evaluation.  These findings were discussed with Dr. Gordon at 8/14/2023 2:22 PM by Dr. Juan Dixon with read back confirmation. LABS:                        11.0   7.23  )-----------( 290      ( 15 Aug 2023 12:30 )             30.9     08-15    139  |  104  |  7   ----------------------------<  119<H>  3.6   |  24  |  0.66    Ca    9.3      15 Aug 2023 12:30  Phos  3.8     08-15  Mg     1.80     08-15    TPro  7.7  /  Alb  3.8  /  TBili  0.7  /  DBili  x   /  AST  45<H>  /  ALT  16  /  AlkPhos  52  08-14  PT/INR - ( 14 Aug 2023 11:00 )   PT: 12.9 sec;   INR: 1.16 ratio    PTT - ( 14 Aug 2023 11:00 )  PTT:27.1 sec      EKG:ord    ech< from: Transthoracic Echocardiogram (08.15.23 @ 06:32) >  CONCLUSIONS:  EF 51 %  1. Normal left ventricular systolic function. No segmental  wall motion abnormalities. GLS -18% (normal).  2. Normal right ventricular size and function.    < end of copied text >        RADIOLOGY & ADDITIONAL TESTS:  ad< from: CT Angio Chest PE Protocol w/ IV Cont (08.14.23 @ 14:05) >    IMPRESSION:  Bilateral pulmonary emboli.  Enlarged pulmonary artery. Echo recommended for complete evaluation.  These findings were discussed with Dr. Gordon at 8/14/2023 2:22 PM by Dr. Juan Dixon with read back confirmation.

## 2023-08-16 ENCOUNTER — TRANSCRIPTION ENCOUNTER (OUTPATIENT)
Age: 43
End: 2023-08-16

## 2023-08-16 VITALS
TEMPERATURE: 99 F | SYSTOLIC BLOOD PRESSURE: 127 MMHG | DIASTOLIC BLOOD PRESSURE: 79 MMHG | OXYGEN SATURATION: 99 % | RESPIRATION RATE: 18 BRPM | HEART RATE: 82 BPM

## 2023-08-16 LAB
ANION GAP SERPL CALC-SCNC: 9 MMOL/L — SIGNIFICANT CHANGE UP (ref 7–14)
BUN SERPL-MCNC: 10 MG/DL — SIGNIFICANT CHANGE UP (ref 7–23)
CALCIUM SERPL-MCNC: 9.3 MG/DL — SIGNIFICANT CHANGE UP (ref 8.4–10.5)
CHLORIDE SERPL-SCNC: 102 MMOL/L — SIGNIFICANT CHANGE UP (ref 98–107)
CO2 SERPL-SCNC: 26 MMOL/L — SIGNIFICANT CHANGE UP (ref 22–31)
CREAT SERPL-MCNC: 0.71 MG/DL — SIGNIFICANT CHANGE UP (ref 0.5–1.3)
EGFR: 107 ML/MIN/1.73M2 — SIGNIFICANT CHANGE UP
GLUCOSE SERPL-MCNC: 113 MG/DL — HIGH (ref 70–99)
HCT VFR BLD CALC: 29.9 % — LOW (ref 34.5–45)
HGB BLD-MCNC: 10.5 G/DL — LOW (ref 11.5–15.5)
MAGNESIUM SERPL-MCNC: 1.8 MG/DL — SIGNIFICANT CHANGE UP (ref 1.6–2.6)
MCHC RBC-ENTMCNC: 35.1 GM/DL — SIGNIFICANT CHANGE UP (ref 32–36)
MCHC RBC-ENTMCNC: 40.7 PG — HIGH (ref 27–34)
MCV RBC AUTO: 115.9 FL — HIGH (ref 80–100)
NRBC # BLD: 0 /100 WBCS — SIGNIFICANT CHANGE UP (ref 0–0)
NRBC # FLD: 0 K/UL — SIGNIFICANT CHANGE UP (ref 0–0)
PHOSPHATE SERPL-MCNC: 4.1 MG/DL — SIGNIFICANT CHANGE UP (ref 2.5–4.5)
PLATELET # BLD AUTO: 281 K/UL — SIGNIFICANT CHANGE UP (ref 150–400)
POTASSIUM SERPL-MCNC: 4 MMOL/L — SIGNIFICANT CHANGE UP (ref 3.5–5.3)
POTASSIUM SERPL-SCNC: 4 MMOL/L — SIGNIFICANT CHANGE UP (ref 3.5–5.3)
RBC # BLD: 2.58 M/UL — LOW (ref 3.8–5.2)
RBC # FLD: 14.6 % — HIGH (ref 10.3–14.5)
SODIUM SERPL-SCNC: 137 MMOL/L — SIGNIFICANT CHANGE UP (ref 135–145)
WBC # BLD: 7.45 K/UL — SIGNIFICANT CHANGE UP (ref 3.8–10.5)
WBC # FLD AUTO: 7.45 K/UL — SIGNIFICANT CHANGE UP (ref 3.8–10.5)

## 2023-08-16 PROCEDURE — 99239 HOSP IP/OBS DSCHRG MGMT >30: CPT

## 2023-08-16 RX ORDER — APIXABAN 2.5 MG/1
1 TABLET, FILM COATED ORAL
Qty: 90 | Refills: 0
Start: 2023-08-16

## 2023-08-16 RX ORDER — APIXABAN 2.5 MG/1
1 TABLET, FILM COATED ORAL
Qty: 74 | Refills: 0
Start: 2023-08-16 | End: 2023-09-14

## 2023-08-16 RX ADMIN — AMLODIPINE BESYLATE 5 MILLIGRAM(S): 2.5 TABLET ORAL at 05:35

## 2023-08-16 RX ADMIN — APIXABAN 10 MILLIGRAM(S): 2.5 TABLET, FILM COATED ORAL at 12:08

## 2023-08-16 NOTE — PHYSICAL THERAPY INITIAL EVALUATION ADULT - ADDITIONAL COMMENTS
Pt lives in an apartment with her boyfriend and daughter, with ramp and elevator access. Pt did not use an assistive device and was independent with ADLs prior. Pt reports no fall in the past 6 months. Pt denies numbness and tingling in bilateral LE and UE.   Pt left sitting at edge of the bed in NAD, all lines intact, HR 88, SpO2 97 post ambulation.

## 2023-08-16 NOTE — DISCHARGE NOTE NURSING/CASE MANAGEMENT/SOCIAL WORK - PATIENT PORTAL LINK FT
You can access the FollowMyHealth Patient Portal offered by [facility] by registering at the following website: [email or web address]. By joining [email or web address] FollowMyHealth portal, you will also be able to view your health information using other applications (apps) compatible with our system.

## 2023-08-16 NOTE — DIETITIAN INITIAL EVALUATION ADULT - ORAL INTAKE PTA/DIET HISTORY
Patient reported she has been working with a nutritionist in the outpatient setting for goal of weight loss.  Patient reported 30 pound weight loss since visiting with her.

## 2023-08-16 NOTE — DIETITIAN INITIAL EVALUATION ADULT - PERTINENT MEDS FT
MEDICATIONS  (STANDING):  amLODIPine   Tablet 5 milliGRAM(s) Oral daily  apixaban 10 milliGRAM(s) Oral every 12 hours    MEDICATIONS  (PRN):

## 2023-08-16 NOTE — DIETITIAN INITIAL EVALUATION ADULT - PROBLEM SELECTOR PLAN 1
C/w Eliquis 10 mg q12  Patient to f/u with PCP Dr Arnold Nurse out patient in 1 week    Will get LE doppler to r/o DVT    ech< from: Transthoracic Echocardiogram (08.15.23 @ 06:32) >  CONCLUSIONS:  EF 51 %  1. Normal left ventricular systolic function. No segmental  wall motion abnormalities. GLS -18% (normal).  2. Normal right ventricular size and function.

## 2023-08-16 NOTE — DIETITIAN INITIAL EVALUATION ADULT - ADD RECOMMEND
1) Monitor weights, PO intake/diet tolerance, skin integrity, pertinent labs.   2) Please consistently document % PO intake in nursing flowsheets to assess adequacy of nutritional intake/monitor need for further nutritional intervention(s).   3) Patient would likely benefit from outpatient RD visit for ongoing nutrition education, individualized nutritional strategies to improve weight status,  and to help ensure long-term adherence to nutritional recommendations.

## 2023-08-16 NOTE — DISCHARGE NOTE NURSING/CASE MANAGEMENT/SOCIAL WORK - NSDCPEFALRISK_GEN_ALL_CORE
For information on Fall & Injury Prevention, visit: [email or web address] OR  [email or web address] OR  https://www.cdc.gov/steadi/patient.html

## 2023-08-16 NOTE — DIETITIAN INITIAL EVALUATION ADULT - OTHER INFO
44 yo F with h/o HTN, OA to knees  s/p gell injection by orthopedics, Anxiety / Depression on no mediation---> Sees a therapist , was in [facility] until 7/25/23 when she was diagnosed with Covid and was treated with Albuterol/Azithro/Lagevrio and Benzonatate cough tablets.  She noted she used marijuana and vodka on 8/10/23 and noted SOB , more yesterday so she came to ED and found to have Pulmonary embolism, started on Eliquis. She was observed in CDU , but admitted for more observation.  Recent h/o Covid  7/25/23.    Patient endorses good appetite and good diet tolerance.  No GI distress reported i.e. nausea, vomiting, diarrhea. No chewing or swallowing difficulties reported. Patient reported weight loss of 30 pounds over the past couple of months since working with a nutritionist as above.  Reported usual body weight  pounds (previously 350 pounds). Current adm weight consistent with reported usual body weight (145kg / 319 pounds). Patient reported mostly sedentary lifestyle and has h/o overconsuming calorically dense foods.   RD reviewed diet modifications and nutritional strategies to promote weight loss and diet/lifestyle changes. Shellfish allergy noted.

## 2023-08-16 NOTE — DIETITIAN INITIAL EVALUATION ADULT - PERTINENT LABORATORY DATA
08-16    137  |  102  |  10  ----------------------------<  113<H>  4.0   |  26  |  0.71    Ca    9.3      16 Aug 2023 06:20  Phos  4.1     08-16  Mg     1.80     08-16

## 2023-08-16 NOTE — DISCHARGE NOTE PROVIDER - NSDCCPTREATMENT_GEN_ALL_CORE_FT
PRINCIPAL PROCEDURE  Procedure: CT chest w contrast  Findings and Treatment: CLINICAL INDICATION: Dyspnea  TECHNIQUE: CTA of the chest was performed after the administration of 95   chief compliant administered of Omnipaque 350, 5 chief compliant discarded.  MIP images were   reconstructed.  COMPARISON: None.  FINDINGS:  PULMONARY EMBOLISM: Right interlobar artery pulmonary embolism extending   into the right middle and lower lobes. Bilateral upper and left lower   lobe segmental/subsegmental pulmonary emboli.  AIRWAYS AND LUNGS: The central tracheobronchial tree is patent.  The   lungs are clear.  MEDIASTINUM AND PLEURA: There are no enlarged mediastinal, hilar or   axillary lymph nodes. The visualized portion of the thyroid gland is   unremarkable. There is no pleural effusion. There is no pneumothorax.  HEART AND VESSELS: There is mild cardiomegaly.   There are no   atherosclerotic calcifications of the aorta.  There is no pericardial   effusion.  Pulmonary artery measures 3.7 cm.  UPPER ABDOMEN: Images of the upper abdomen demonstrate no abnormality.  BONES AND SOFT TISSUES: The bones are unremarkable.  The soft tissues are   unremarkable.  IMPRESSION:  Bilateral pulmonary emboli.  Enlarged pulmonary artery. Echo recommended for complete evaluation.  These findings were discussed with Dr. Gordon at 8/14/2023 2:22 PM by Dr. Juan Dixon with read back confirmation.  --- End of Report ---< from: CT Angio Chest PE Protocol w/ IV Cont (08.14.23 @ 14:05) >  < end of copied text >        SECONDARY PROCEDURE  Procedure: Transthoracic echocardiogram  Findings and Treatment:   < end of copied text >  DIMENSIONS:  Dimensions:     Normal Values:  LA:     4.2 cm    2.0 - 4.0 cm  Ao:     2.6 cm    2.0 - 3.8 cm  SEPTUM: 1.3 cm    0.6 - 1.2 cm  PWT:    1.5 cm    0.6 - 1.1 cm  LVIDd:  5.0 cm    3.0 - 5.6 cm  LVIDs:  3.6 cm    1.8 - 4.0 cm  Derived Variables:  LVMI: 119 g/m2  RWT: 0.59  Fractional short: 28 %  Ejection Fraction (3D Quantification): 51 %  ------------------------------------------------------------------------  OBSERVATIONS:  Mitral Valve: Normal mitral valve.  Aortic Root: Normal aortic root.  Aortic Valve: Normal trileaflet aortic valve.  Left Atrium: Normal left atrium.  Left Ventricle: Normal left ventricular systolic function.  No segmental wall motion abnormalities. GLS -18% (normal).  Normal left ventricular internal dimensions and wall  thicknesses. Indeterminate diastolic function.  Right Heart: Normal right atrium. Normal right ventricular  size and function. Normal tricuspid valve. Normal pulmonic  valve.  Pericardium/PleuraNormal pericardium with no pericardial  effusion.  ------------------------------------------------------------------------  CONCLUSIONS:  1. Normal left ventricular systolic function. No segmental  wall motion abnormalities. GLS -18% (normal).  2. Normal right ventricular size and function.< from: Transthoracic Echocardiogram (08.15.23 @ 06:32) >

## 2023-08-16 NOTE — PHYSICAL THERAPY INITIAL EVALUATION ADULT - PERTINENT HX OF CURRENT PROBLEM, REHAB EVAL
Pt is a 43 year old female with a past medical history of HTN, OA to knees  s/p gell injection by orthopedics, Anxiety / Depression on no mediation---> Sees a therapist , was in [facility] until 7/25/23 when she was diagnosed with Covid and was treated with Albuterol/Azithro/Lagevrio and Benzonatate cough tablets.  She noted she used marijuana and vodka on 8/10/23 and noted SOB ,more yesterday 8/14 so she came to ED and found to have Pulmonary embolism, started on Eliquis. Pt is a 43 year old female with a past medical history of HTN, OA to knees  s/p gell injection by orthopedics, Anxiety / Depression on no mediation---> Sees a therapist , was in [facility] until 7/25/23 when she was diagnosed with Covid and was treated with Albuterol/Azithro/Lagevrio and Benzonatate cough tablets. She noted she used marijuana and vodka on 8/10/23 and noted SOB ,more yesterday 8/14 so she came to ED and found to have Pulmonary embolism, started on Eliquis.

## 2023-08-16 NOTE — DISCHARGE NOTE PROVIDER - YES NO FOR MLM POSITIVE OR NEGATIVE COVID RESULT
Assumed care of patient and received report from [facility]. Pt resting comfortably in bed with call bell in reach. 1:1 sitter at bedside, pt in green gown, pt belongings at the nurses station and room psych safe. ,

## 2023-08-16 NOTE — DISCHARGE NOTE PROVIDER - NSDCMRMEDTOKEN_GEN_ALL_CORE_FT
Eliquis Starter Pack for Treatment of DVT and PE 5 mg oral tablet: 1 tab(s) orally 2 times a day  Norvasc 5 mg oral tablet: 1 orally once a day   Eliquis Starter Pack for Treatment of DVT and PE 5 mg oral tablet: 1 tab(s) orally 2 times a day Please skip first 3 days of pack as you recieved these doses in the hospital  Norvasc 5 mg oral tablet: 1 orally once a day

## 2023-08-16 NOTE — DISCHARGE NOTE PROVIDER - HOSPITAL COURSE
44 yo F with h/o HTN, OA to knees  s/p gell injection by orthopedics, Anxiety / Depression on no mediation, was in [facility] until 7/25/23 when she was diagnosed with Covid and was treated with Albuterol/Azithro/Lagevrio and Benzonatate cough tablets. She noted decreased activity during that time. + marijuana and alcohol use noted SOB found to have B/l PE on CTA. She had a TTE which did not show any RV strain/enlargement and her sats remained above 92% on ambulation. Pt did reveal that she has Mirena IUD which may have contributed to thrombosis in addition to smoking marijuana. 42 yo F with h/o HTN, OA to knees  s/p gell injection by orthopedics, Anxiety / Depression on no mediation, was in [facility] until 7/25/23 when she was diagnosed with Covid and was treated with Albuterol/Azithro/Lagevrio and Benzonatate cough tablets. She noted decreased activity during that time. + marijuana and alcohol use noted SOB found to have B/l PE on CTA. She had a TTE which did not show any RV strain/enlargement and her sats remained above 92% on ambulation. Pt did reveal that she has Mirena IUD which may have contributed to thrombosis in addition to smoking marijuana.     Vital Signs Last 24 Hrs  T(C): 37 (16 Aug 2023 13:31), Max: 37 (16 Aug 2023 13:31)  T(F): 98.6 (16 Aug 2023 13:31), Max: 98.6 (16 Aug 2023 13:31)  HR: 82 (16 Aug 2023 13:31) (77 - 83)  BP: 127/79 (16 Aug 2023 13:31) (127/79 - 166/84)  BP(mean): --  RR: 18 (16 Aug 2023 13:31) (17 - 20)  SpO2: 99% (16 Aug 2023 13:31) (96% - 100%)    Parameters below as of 16 Aug 2023 13:31  Patient On (Oxygen Delivery Method): room air        PHYSICAL EXAM:  GENERAL: NAD  HEAD: Normocephalic  EYES: PERRLA, conjunctiva and sclera clear  CHEST/LUNG: Clear to auscultation bilaterally; No wheeze  HEART: Regular rate and rhythm; No murmurs, rubs, or gallops  ABDOMEN: Soft, Nontender, Nondistended; Bowel sounds present  EXTREMITIES:  2+ Peripheral Pulses, No clubbing, cyanosis, or edema  PSYCH: AAOx3    discharge 40 min

## 2023-08-16 NOTE — DISCHARGE NOTE PROVIDER - NSDCCPCAREPLAN_GEN_ALL_CORE_FT
PRINCIPAL DISCHARGE DIAGNOSIS  Diagnosis: Pulmonary embolism  Assessment and Plan of Treatment: You were treated with anticoagulation for a clot in the lungs. Please f/u with your PCP for continued management.

## 2023-08-16 NOTE — DISCHARGE NOTE PROVIDER - DETAILS OF MALNUTRITION DIAGNOSIS/DIAGNOSES
This patient has been assessed with a concern for Malnutrition and was treated during this hospitalization for the following Nutrition diagnosis/diagnoses:     -  08/16/2023: Morbid obesity (BMI > 40)

## 2023-08-16 NOTE — DIETITIAN INITIAL EVALUATION ADULT - PROBLEM SELECTOR PLAN 4
Already on Eliquis for PE    No NSAIDS/ no extreme sports.  Patient counselled  Obese --> rec nutritionist  for Wt loss diet..    Dispo  Most likely home 8/16/23 , plan d/w patient and PA

## 2023-08-16 NOTE — DISCHARGE NOTE PROVIDER - CARE PROVIDER_API CALL
Nurse, aCtalina De La Cruz  Internal Medicine  [address] [place] [address]  Phone: [phone]  Fax: [phone]  Scheduled Appointment: 08/28/2023

## 2023-08-16 NOTE — PHYSICAL THERAPY INITIAL EVALUATION ADULT - CRITERIA FOR SKILLED THERAPEUTIC INTERVENTIONS
functional limitations in following categories/risk reduction/prevention/rehab potential/therapy frequency/predicted duration of therapy intervention/anticipated discharge recommendation

## 2023-08-19 ENCOUNTER — TRANSCRIPTION ENCOUNTER (OUTPATIENT)
Age: 43
End: 2023-08-19

## 2023-08-20 NOTE — ED POST DISCHARGE NOTE - RESULT SUMMARY
Received a call from [facility] regarding the pt's visit. Advised them of the CTA results and ef on echo, advised to contact medical records should the reports need to be faxed.

## 2023-08-23 ENCOUNTER — EMERGENCY (EMERGENCY)
Facility: HOSPITAL | Age: 43
LOS: 1 days | Discharge: HOME | End: 2023-08-23
Attending: STUDENT IN AN ORGANIZED HEALTH CARE EDUCATION/TRAINING PROGRAM | Admitting: STUDENT IN AN ORGANIZED HEALTH CARE EDUCATION/TRAINING PROGRAM
Payer: COMMERCIAL

## 2023-08-23 VITALS
TEMPERATURE: 98 F | OXYGEN SATURATION: 100 % | DIASTOLIC BLOOD PRESSURE: 90 MMHG | SYSTOLIC BLOOD PRESSURE: 152 MMHG | HEART RATE: 75 BPM | RESPIRATION RATE: 18 BRPM

## 2023-08-23 DIAGNOSIS — Z98.891 HISTORY OF UTERINE SCAR FROM PREVIOUS SURGERY: Chronic | ICD-10-CM

## 2023-08-23 DIAGNOSIS — D21.9 BENIGN NEOPLASM OF CONNECTIVE AND OTHER SOFT TISSUE, UNSPECIFIED: Chronic | ICD-10-CM

## 2023-08-23 LAB
ALBUMIN SERPL ELPH-MCNC: 4.1 G/DL — SIGNIFICANT CHANGE UP (ref 3.3–5)
ALP SERPL-CCNC: 64 U/L — SIGNIFICANT CHANGE UP (ref 40–120)
ALT FLD-CCNC: 10 U/L — SIGNIFICANT CHANGE UP (ref 4–33)
ANION GAP SERPL CALC-SCNC: 12 MMOL/L — SIGNIFICANT CHANGE UP (ref 7–14)
APTT BLD: 33.5 SEC — SIGNIFICANT CHANGE UP (ref 24.5–35.6)
AST SERPL-CCNC: 14 U/L — SIGNIFICANT CHANGE UP (ref 4–32)
BASE EXCESS BLDV CALC-SCNC: -0.5 MMOL/L — SIGNIFICANT CHANGE UP (ref -2–3)
BILIRUB SERPL-MCNC: 0.5 MG/DL — SIGNIFICANT CHANGE UP (ref 0.2–1.2)
BUN SERPL-MCNC: 12 MG/DL — SIGNIFICANT CHANGE UP (ref 7–23)
CA-I SERPL-SCNC: 1.21 MMOL/L — SIGNIFICANT CHANGE UP (ref 1.15–1.33)
CALCIUM SERPL-MCNC: 9.3 MG/DL — SIGNIFICANT CHANGE UP (ref 8.4–10.5)
CHLORIDE BLDV-SCNC: 106 MMOL/L — SIGNIFICANT CHANGE UP (ref 96–108)
CHLORIDE SERPL-SCNC: 104 MMOL/L — SIGNIFICANT CHANGE UP (ref 98–107)
CO2 BLDV-SCNC: 27.3 MMOL/L — HIGH (ref 22–26)
CO2 SERPL-SCNC: 23 MMOL/L — SIGNIFICANT CHANGE UP (ref 22–31)
CREAT SERPL-MCNC: 0.85 MG/DL — SIGNIFICANT CHANGE UP (ref 0.5–1.3)
EGFR: 86 ML/MIN/1.73M2 — SIGNIFICANT CHANGE UP
GAS PNL BLDV: 137 MMOL/L — SIGNIFICANT CHANGE UP (ref 136–145)
GAS PNL BLDV: SIGNIFICANT CHANGE UP
GLUCOSE BLDV-MCNC: 89 MG/DL — SIGNIFICANT CHANGE UP (ref 70–99)
GLUCOSE SERPL-MCNC: 100 MG/DL — HIGH (ref 70–99)
HCO3 BLDV-SCNC: 26 MMOL/L — SIGNIFICANT CHANGE UP (ref 22–29)
HCT VFR BLD CALC: 29.2 % — LOW (ref 34.5–45)
HCT VFR BLDA CALC: 31 % — LOW (ref 34.5–46.5)
HGB BLD CALC-MCNC: 10.3 G/DL — LOW (ref 11.7–16.1)
HGB BLD-MCNC: 10 G/DL — LOW (ref 11.5–15.5)
INR BLD: 1.18 RATIO — SIGNIFICANT CHANGE UP (ref 0.85–1.18)
LACTATE BLDV-MCNC: 0.8 MMOL/L — SIGNIFICANT CHANGE UP (ref 0.5–2)
MCHC RBC-ENTMCNC: 34.2 GM/DL — SIGNIFICANT CHANGE UP (ref 32–36)
MCHC RBC-ENTMCNC: 39.8 PG — HIGH (ref 27–34)
MCV RBC AUTO: 116.3 FL — HIGH (ref 80–100)
NRBC # BLD: 0 /100 WBCS — SIGNIFICANT CHANGE UP (ref 0–0)
NRBC # FLD: 0 K/UL — SIGNIFICANT CHANGE UP (ref 0–0)
NT-PROBNP SERPL-SCNC: <36 PG/ML — SIGNIFICANT CHANGE UP
PCO2 BLDV: 49 MMHG — SIGNIFICANT CHANGE UP (ref 39–52)
PH BLDV: 7.33 — SIGNIFICANT CHANGE UP (ref 7.32–7.43)
PLATELET # BLD AUTO: 295 K/UL — SIGNIFICANT CHANGE UP (ref 150–400)
PO2 BLDV: 25 MMHG — SIGNIFICANT CHANGE UP (ref 25–45)
POTASSIUM BLDV-SCNC: 4 MMOL/L — SIGNIFICANT CHANGE UP (ref 3.5–5.1)
POTASSIUM SERPL-MCNC: 4.1 MMOL/L — SIGNIFICANT CHANGE UP (ref 3.5–5.3)
POTASSIUM SERPL-SCNC: 4.1 MMOL/L — SIGNIFICANT CHANGE UP (ref 3.5–5.3)
PROT SERPL-MCNC: 7.9 G/DL — SIGNIFICANT CHANGE UP (ref 6–8.3)
PROTHROM AB SERPL-ACNC: 13.1 SEC — HIGH (ref 9.5–13)
RBC # BLD: 2.51 M/UL — LOW (ref 3.8–5.2)
RBC # FLD: 14.6 % — HIGH (ref 10.3–14.5)
SAO2 % BLDV: 25.6 % — LOW (ref 67–88)
SODIUM SERPL-SCNC: 139 MMOL/L — SIGNIFICANT CHANGE UP (ref 135–145)
TROPONIN T, HIGH SENSITIVITY RESULT: 7 NG/L — SIGNIFICANT CHANGE UP
WBC # BLD: 7.78 K/UL — SIGNIFICANT CHANGE UP (ref 3.8–10.5)
WBC # FLD AUTO: 7.78 K/UL — SIGNIFICANT CHANGE UP (ref 3.8–10.5)

## 2023-08-23 PROCEDURE — 93010 ELECTROCARDIOGRAM REPORT: CPT

## 2023-08-23 PROCEDURE — 71046 X-RAY EXAM CHEST 2 VIEWS: CPT | Mod: 26

## 2023-08-23 PROCEDURE — 99284 EMERGENCY DEPT VISIT MOD MDM: CPT

## 2023-08-23 RX ADMIN — Medication 0.5 MILLIGRAM(S): at 23:40

## 2023-08-23 NOTE — ED ADULT TRIAGE NOTE - CHIEF COMPLAINT QUOTE
c/o shortness of breath x 2 week, admitted for pulmonary embolism and discharged on the 16th with no improvement on symptoms. 100% on room air, on oxygen for comfort. Hx of HTN, PE on eliquis, arthritis

## 2023-08-23 NOTE — ED PROVIDER NOTE - PHYSICAL EXAMINATION
Const: Well-nourished, Well-developed, appearing stated age.  Eyes: no conjunctival injection, and symmetrical lids.  HEENT: Head NCAT, no lesions. Atraumatic external nose and ears. Moist MM.  Neck: Symmetric, trachea midline.   CVS: +S1/S2   RESP: Unlabored respiratory effort. Clear to auscultation bilaterally.  GI: Nontender/Nondistended,   MSK: Normocephalic/Atraumatic, Lower Extremities w/o calf tenderness or edema b/l.   Skin: Warm, dry and intact.   Neuro: CNs II-XII grossly intact. Motor & Sensation grossly intact.  Psych: Awake, Alert, & Oriented (AAO) x3. Appropriate mood and affect.

## 2023-08-23 NOTE — ED PROVIDER NOTE - NSFOLLOWUPINSTRUCTIONS_ED_ALL_ED_FT
It was a pleasure caring for you today.  Please make sure to stay hydrated and get plenty of rest.  Please make sure to follow-up with your primary care doctor in the next 2 to 3 days for follow-up care.  Please return to the hospital if you have any new or worsening symptoms including chest pain, shortness of breath, dizziness, passing out.    Pulmonary Embolism     [email or web address]    A pulmonary embolism (PE) is a sudden blockage or decrease of blood flow in one or both lungs that happens when a clot travels into the arteries of the lung (pulmonary arteries). Most blockages come from a blood clot that forms in the vein of a leg or arm (deep vein thrombosis, DVT) and travels to the lungs. A clot is blood that has thickened into a gel or solid. PE is a dangerous and life-threatening condition that needs to be treated right away.    What are the causes?  This condition is usually caused by a blood clot that forms in a vein and moves to the lungs. In rare cases, it may be caused by air, fat, part of a tumor, or other tissue that moves through the veins and into the lungs.    What increases the risk?  The following factors may make you more likely to develop this condition:    Experiencing a traumatic injury, such as breaking a hip or leg.  Having:    A spinal cord injury.  Major surgery, especially hip or knee replacement, or surgery on parts of the nervous system or on the abdomen.  A stroke.  A blood clotting disease.  Long-term (chronic) lung or heart disease.  Cancer, especially if you are being treated with chemotherapy.  A central venous catheter.  Taking medicines that contain estrogen. These include birth control pills and hormone replacement therapy.  Being:    Pregnant.  In the period of time after your baby is delivered (postpartum).  Older than age 60.  Overweight.  A smoker, especially if you have other risks.  Not very active (sedentary), not being able to move at all, or spending long periods sitting, such as travel over 6 hours. You are also at a greater risk if you have a leg in a cast or splint.    What are the signs or symptoms?  Symptoms of this condition usually start suddenly and include:    Shortness of breath during activity or at rest.  Coughing, coughing up blood, or coughing up bloody mucus.  Chest pain, back pain, or shoulder blade pain that gets worse with deep breaths.  Rapid or irregular heartbeat.  Feeling light-headed or dizzy, or fainting.  Feeling anxious.  Pain and swelling in a leg. This is a symptom of DVT, which can lead to PE.    How is this diagnosed?  This condition may be diagnosed based on your medical history, a physical exam, and tests. Tests may include:    Blood tests.  An ECG (electrocardiogram) of the heart.  A CT pulmonary angiogram. This test checks blood flow in and around your lungs.  A ventilation-perfusion scan, also called a lung VQ scan. This test measures air flow and blood flow to the lungs.  An ultrasound to check for a DVT.    How is this treated?  Treatment for this condition depends on many factors, such as the cause of your PE, your risk for bleeding or developing more clots, and other medical conditions you may have. Treatment aims to stop blood clots from forming or growing larger. In some cases, treatment may be aimed at breaking apart or removing the blood clot. Treatment may include:    Medicines, such as:    Blood thinning medicines, also called anticoagulants, to stop clots from forming and growing.  Medicines that break apart clots (thrombolytics).  Procedures, such as:    Using a flexible tube to remove a blood clot (embolectomy) or to deliver medicine to destroy it (catheter-directed thrombolysis).  Surgery to remove the clot (surgical embolectomy). This is rare.    You may need a combination of immediate, long-term, and extended treatments. Your treatment may continue for several months (maintenance therapy) or longer depending on your medical conditions. You and your health care provider will work together to choose the treatment program that is best for you.    Follow these instructions at home:      Medicines    Take over-the-counter and prescription medicines only as told by your health care provider.   If you are taking blood thinners:    Talk with your health care provider before you take any medicines that contain aspirin or NSAIDs, such as ibuprofen. These medicines increase your risk for dangerous bleeding.  Take your medicine exactly as told, at the same time every day.  Avoid activities that could cause injury or bruising, and follow instructions about how to prevent falls.  Wear a medical alert bracelet or carry a card that lists what medicines you take.  Understand what foods and drugs interact with any medicines that you are taking.        General instructions    Ask your health care provider when you may return to your normal activities. Avoid sitting or lying for a long time without moving.  Maintain a healthy weight. Ask your health care provider what weight is healthy for you.  Do not use any products that contain nicotine or tobacco, such as cigarettes, e-cigarettes, and chewing tobacco. If you need help quitting, ask your health care provider.  Talk with your health care provider about any travel plans. It is important to make sure that you are still able to take your medicine while traveling.  Keep all follow-up visits as told by your health care provider. This is important.    Where to find more information  American Lung Association: www.lung.org  Centers for Disease Control and Prevention: www.cdc.gov    Contact a health care provider if:  You missed a dose of your blood thinner medicine.    Get help right away if you:  Have:    New or increased pain, swelling, warmth, or redness in an arm or leg.  Shortness of breath that gets worse during activity or at rest.  A fever.  Worsening chest pain.  A rapid or irregular heartbeat.  A severe headache.  Vision changes.  A serious fall or accident, or you hit your head.  Stomach pain.  Blood in your vomit, stool, or urine.  A cut that will not stop bleeding.  Cough up blood.  Feel light-headed or dizzy, and that feeling does not go away.  Cannot move your arms or legs.  Are confused or have memory loss.    These symptoms may represent a serious problem that is an emergency. Do not wait to see if the symptoms will go away. Get medical help right away. Call your local emergency services ( in the [place] Do not drive yourself to the hospital.    Summary  A pulmonary embolism (PE) is a serious and potentially life-threatening condition, in which a blood clot from one part of the body (deep vein thrombosis, DVT) travels to the arteries of the lung, causing a sudden blockage or decrease of blood flow to the lungs. This may result in shortness of breath, chest pain, dizziness, and fainting.  Treatments for this condition usually include medicines to thin your blood (anticoagulants) or medicines to break apart blood clots (thrombolytics).   If you are given blood thinners, take your medicine exactly as told by your health care provider, at the same time every day. This is important.  Understand what foods and drugs interact with any medicines that you are taking.  If you have signs of PE or DVT, call your local emergency services (911 in the [place]    ADDITIONAL NOTES AND INSTRUCTIONS    Please follow up with your Primary MD in 24-48 hr.  Seek immediate medical care for any new/worsening signs or symptoms.
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
## 2023-08-23 NOTE — ED ADULT NURSE NOTE - NSICDXPASTMEDICALHX_GEN_ALL_CORE_FT
Pastoral Care visit wife.      Joanne Browne,    PAST MEDICAL HISTORY:  Anemia     HTN (hypertension)

## 2023-08-23 NOTE — ED PROVIDER NOTE - PROGRESS NOTE DETAILS
Samuel Hinojosa, PGY3 Patient's labs show no acute findings, no pneumothorax on x-ray.  Patient states she symptomatically feels better and would like to go home.Will DC with return precautions.

## 2023-08-23 NOTE — ED PROVIDER NOTE - ATTENDING CONTRIBUTION TO CARE
42 yo F with h/o recently diagnosed PE (about 1 week ago) on Eliquis who presents to the ED with continued shortness of breath. She reports SOB is intermittent, usually associated with exertion or when she is upset. Has been going on since she was diagnosed with a PE, not getting worse. Was admitted here when initially diagnosed with her PE, started on Eliquis and then also admitted earlier this week at [facility] where she had a repeat CTA which demonstrated bilateral PE again. PE was not massive or submassive, no indication for thrombectomy. Her vitals are stable here, she is ambulating without difficulty and able to speak in complete sentences. Plan for basic labs, CXR and most likely dispo if work-up normal. Discussed symptoms most likely secondary to known PE, already on appropriate therapy. Discussed return precautions

## 2023-08-23 NOTE — ED PROVIDER NOTE - PATIENT PORTAL LINK FT
You can access the FollowMyHealth Patient Portal offered by [facility] by registering at the following website: [email or web address]. By joining [employer]’s FollowMyHealth portal, you will also be able to view your health information using other applications (apps) compatible with our system.

## 2023-08-23 NOTE — ED PROVIDER NOTE - OBJECTIVE STATEMENT
43-year-old female history of hypertension and recently diagnosed pulmonary embolus on Eliquis presented to the ED with shortness of breath.  Patient notes that shortness of breath has been persistent since she was found to have the PE.  Notes it worsens when she is upset but other times feels asymptomatic.  Over the weekend she was upset and being came short of breath and went to [facility] where she had another CTA done which showed subsegmental PEs.  denying chest pain, nausea, vomiting, abdominal pain.  No worsening lower extremity swelling.  Satting 100% here.

## 2023-08-24 VITALS
TEMPERATURE: 98 F | SYSTOLIC BLOOD PRESSURE: 136 MMHG | OXYGEN SATURATION: 100 % | DIASTOLIC BLOOD PRESSURE: 62 MMHG | HEART RATE: 79 BPM | RESPIRATION RATE: 18 BRPM

## 2023-12-18 ENCOUNTER — EMERGENCY (EMERGENCY)
Facility: HOSPITAL | Age: 43
LOS: 1 days | Discharge: HOME | End: 2023-12-18
Attending: EMERGENCY MEDICINE | Admitting: EMERGENCY MEDICINE
Payer: COMMERCIAL

## 2023-12-18 VITALS
DIASTOLIC BLOOD PRESSURE: 92 MMHG | RESPIRATION RATE: 18 BRPM | HEART RATE: 89 BPM | TEMPERATURE: 98 F | SYSTOLIC BLOOD PRESSURE: 150 MMHG | OXYGEN SATURATION: 98 %

## 2023-12-18 VITALS
DIASTOLIC BLOOD PRESSURE: 81 MMHG | TEMPERATURE: 99 F | HEART RATE: 77 BPM | OXYGEN SATURATION: 96 % | RESPIRATION RATE: 17 BRPM | SYSTOLIC BLOOD PRESSURE: 144 MMHG

## 2023-12-18 DIAGNOSIS — D21.9 BENIGN NEOPLASM OF CONNECTIVE AND OTHER SOFT TISSUE, UNSPECIFIED: Chronic | ICD-10-CM

## 2023-12-18 DIAGNOSIS — Z98.891 HISTORY OF UTERINE SCAR FROM PREVIOUS SURGERY: Chronic | ICD-10-CM

## 2023-12-18 LAB
ALBUMIN SERPL ELPH-MCNC: 4.2 G/DL — SIGNIFICANT CHANGE UP (ref 3.3–5)
ALBUMIN SERPL ELPH-MCNC: 4.2 G/DL — SIGNIFICANT CHANGE UP (ref 3.3–5)
ALP SERPL-CCNC: 72 U/L — SIGNIFICANT CHANGE UP (ref 40–120)
ALP SERPL-CCNC: 72 U/L — SIGNIFICANT CHANGE UP (ref 40–120)
ALT FLD-CCNC: 8 U/L — SIGNIFICANT CHANGE UP (ref 4–33)
ALT FLD-CCNC: 8 U/L — SIGNIFICANT CHANGE UP (ref 4–33)
ANION GAP SERPL CALC-SCNC: 12 MMOL/L — SIGNIFICANT CHANGE UP (ref 7–14)
ANION GAP SERPL CALC-SCNC: 12 MMOL/L — SIGNIFICANT CHANGE UP (ref 7–14)
APTT BLD: 40.4 SEC — HIGH (ref 24.5–35.6)
APTT BLD: 40.4 SEC — HIGH (ref 24.5–35.6)
AST SERPL-CCNC: 9 U/L — SIGNIFICANT CHANGE UP (ref 4–32)
AST SERPL-CCNC: 9 U/L — SIGNIFICANT CHANGE UP (ref 4–32)
B PERT DNA SPEC QL NAA+PROBE: SIGNIFICANT CHANGE UP
B PERT DNA SPEC QL NAA+PROBE: SIGNIFICANT CHANGE UP
B PERT+PARAPERT DNA PNL SPEC NAA+PROBE: SIGNIFICANT CHANGE UP
B PERT+PARAPERT DNA PNL SPEC NAA+PROBE: SIGNIFICANT CHANGE UP
BASOPHILS # BLD AUTO: 0.02 K/UL — SIGNIFICANT CHANGE UP (ref 0–0.2)
BASOPHILS # BLD AUTO: 0.02 K/UL — SIGNIFICANT CHANGE UP (ref 0–0.2)
BASOPHILS NFR BLD AUTO: 0.4 % — SIGNIFICANT CHANGE UP (ref 0–2)
BASOPHILS NFR BLD AUTO: 0.4 % — SIGNIFICANT CHANGE UP (ref 0–2)
BILIRUB SERPL-MCNC: 0.4 MG/DL — SIGNIFICANT CHANGE UP (ref 0.2–1.2)
BILIRUB SERPL-MCNC: 0.4 MG/DL — SIGNIFICANT CHANGE UP (ref 0.2–1.2)
BORDETELLA PARAPERTUSSIS (RAPRVP): SIGNIFICANT CHANGE UP
BORDETELLA PARAPERTUSSIS (RAPRVP): SIGNIFICANT CHANGE UP
BUN SERPL-MCNC: 7 MG/DL — SIGNIFICANT CHANGE UP (ref 7–23)
BUN SERPL-MCNC: 7 MG/DL — SIGNIFICANT CHANGE UP (ref 7–23)
C PNEUM DNA SPEC QL NAA+PROBE: SIGNIFICANT CHANGE UP
C PNEUM DNA SPEC QL NAA+PROBE: SIGNIFICANT CHANGE UP
CALCIUM SERPL-MCNC: 9.3 MG/DL — SIGNIFICANT CHANGE UP (ref 8.4–10.5)
CALCIUM SERPL-MCNC: 9.3 MG/DL — SIGNIFICANT CHANGE UP (ref 8.4–10.5)
CHLORIDE SERPL-SCNC: 101 MMOL/L — SIGNIFICANT CHANGE UP (ref 98–107)
CHLORIDE SERPL-SCNC: 101 MMOL/L — SIGNIFICANT CHANGE UP (ref 98–107)
CO2 SERPL-SCNC: 26 MMOL/L — SIGNIFICANT CHANGE UP (ref 22–31)
CO2 SERPL-SCNC: 26 MMOL/L — SIGNIFICANT CHANGE UP (ref 22–31)
CREAT SERPL-MCNC: 0.78 MG/DL — SIGNIFICANT CHANGE UP (ref 0.5–1.3)
CREAT SERPL-MCNC: 0.78 MG/DL — SIGNIFICANT CHANGE UP (ref 0.5–1.3)
EGFR: 95 ML/MIN/1.73M2 — SIGNIFICANT CHANGE UP
EGFR: 95 ML/MIN/1.73M2 — SIGNIFICANT CHANGE UP
EOSINOPHIL # BLD AUTO: 0.14 K/UL — SIGNIFICANT CHANGE UP (ref 0–0.5)
EOSINOPHIL # BLD AUTO: 0.14 K/UL — SIGNIFICANT CHANGE UP (ref 0–0.5)
EOSINOPHIL NFR BLD AUTO: 2.6 % — SIGNIFICANT CHANGE UP (ref 0–6)
EOSINOPHIL NFR BLD AUTO: 2.6 % — SIGNIFICANT CHANGE UP (ref 0–6)
FLUAV H3 RNA SPEC QL NAA+PROBE: DETECTED
FLUAV H3 RNA SPEC QL NAA+PROBE: DETECTED
FLUBV RNA SPEC QL NAA+PROBE: SIGNIFICANT CHANGE UP
FLUBV RNA SPEC QL NAA+PROBE: SIGNIFICANT CHANGE UP
GLUCOSE SERPL-MCNC: 99 MG/DL — SIGNIFICANT CHANGE UP (ref 70–99)
GLUCOSE SERPL-MCNC: 99 MG/DL — SIGNIFICANT CHANGE UP (ref 70–99)
HADV DNA SPEC QL NAA+PROBE: SIGNIFICANT CHANGE UP
HADV DNA SPEC QL NAA+PROBE: SIGNIFICANT CHANGE UP
HCOV 229E RNA SPEC QL NAA+PROBE: SIGNIFICANT CHANGE UP
HCOV 229E RNA SPEC QL NAA+PROBE: SIGNIFICANT CHANGE UP
HCOV HKU1 RNA SPEC QL NAA+PROBE: SIGNIFICANT CHANGE UP
HCOV HKU1 RNA SPEC QL NAA+PROBE: SIGNIFICANT CHANGE UP
HCOV NL63 RNA SPEC QL NAA+PROBE: SIGNIFICANT CHANGE UP
HCOV NL63 RNA SPEC QL NAA+PROBE: SIGNIFICANT CHANGE UP
HCOV OC43 RNA SPEC QL NAA+PROBE: SIGNIFICANT CHANGE UP
HCOV OC43 RNA SPEC QL NAA+PROBE: SIGNIFICANT CHANGE UP
HCT VFR BLD CALC: 43.3 % — SIGNIFICANT CHANGE UP (ref 34.5–45)
HCT VFR BLD CALC: 43.3 % — SIGNIFICANT CHANGE UP (ref 34.5–45)
HGB BLD-MCNC: 13.9 G/DL — SIGNIFICANT CHANGE UP (ref 11.5–15.5)
HGB BLD-MCNC: 13.9 G/DL — SIGNIFICANT CHANGE UP (ref 11.5–15.5)
HMPV RNA SPEC QL NAA+PROBE: SIGNIFICANT CHANGE UP
HMPV RNA SPEC QL NAA+PROBE: SIGNIFICANT CHANGE UP
HPIV1 RNA SPEC QL NAA+PROBE: SIGNIFICANT CHANGE UP
HPIV1 RNA SPEC QL NAA+PROBE: SIGNIFICANT CHANGE UP
HPIV2 RNA SPEC QL NAA+PROBE: SIGNIFICANT CHANGE UP
HPIV2 RNA SPEC QL NAA+PROBE: SIGNIFICANT CHANGE UP
HPIV3 RNA SPEC QL NAA+PROBE: SIGNIFICANT CHANGE UP
HPIV3 RNA SPEC QL NAA+PROBE: SIGNIFICANT CHANGE UP
HPIV4 RNA SPEC QL NAA+PROBE: SIGNIFICANT CHANGE UP
HPIV4 RNA SPEC QL NAA+PROBE: SIGNIFICANT CHANGE UP
IANC: 3.61 K/UL — SIGNIFICANT CHANGE UP (ref 1.8–7.4)
IANC: 3.61 K/UL — SIGNIFICANT CHANGE UP (ref 1.8–7.4)
IMM GRANULOCYTES NFR BLD AUTO: 0.2 % — SIGNIFICANT CHANGE UP (ref 0–0.9)
IMM GRANULOCYTES NFR BLD AUTO: 0.2 % — SIGNIFICANT CHANGE UP (ref 0–0.9)
INR BLD: 1.27 RATIO — HIGH (ref 0.85–1.18)
INR BLD: 1.27 RATIO — HIGH (ref 0.85–1.18)
LYMPHOCYTES # BLD AUTO: 1.11 K/UL — SIGNIFICANT CHANGE UP (ref 1–3.3)
LYMPHOCYTES # BLD AUTO: 1.11 K/UL — SIGNIFICANT CHANGE UP (ref 1–3.3)
LYMPHOCYTES # BLD AUTO: 20.6 % — SIGNIFICANT CHANGE UP (ref 13–44)
LYMPHOCYTES # BLD AUTO: 20.6 % — SIGNIFICANT CHANGE UP (ref 13–44)
M PNEUMO DNA SPEC QL NAA+PROBE: SIGNIFICANT CHANGE UP
M PNEUMO DNA SPEC QL NAA+PROBE: SIGNIFICANT CHANGE UP
MCHC RBC-ENTMCNC: 28.9 PG — SIGNIFICANT CHANGE UP (ref 27–34)
MCHC RBC-ENTMCNC: 28.9 PG — SIGNIFICANT CHANGE UP (ref 27–34)
MCHC RBC-ENTMCNC: 32.1 GM/DL — SIGNIFICANT CHANGE UP (ref 32–36)
MCHC RBC-ENTMCNC: 32.1 GM/DL — SIGNIFICANT CHANGE UP (ref 32–36)
MCV RBC AUTO: 90 FL — SIGNIFICANT CHANGE UP (ref 80–100)
MCV RBC AUTO: 90 FL — SIGNIFICANT CHANGE UP (ref 80–100)
MONOCYTES # BLD AUTO: 0.49 K/UL — SIGNIFICANT CHANGE UP (ref 0–0.9)
MONOCYTES # BLD AUTO: 0.49 K/UL — SIGNIFICANT CHANGE UP (ref 0–0.9)
MONOCYTES NFR BLD AUTO: 9.1 % — SIGNIFICANT CHANGE UP (ref 2–14)
MONOCYTES NFR BLD AUTO: 9.1 % — SIGNIFICANT CHANGE UP (ref 2–14)
NEUTROPHILS # BLD AUTO: 3.61 K/UL — SIGNIFICANT CHANGE UP (ref 1.8–7.4)
NEUTROPHILS # BLD AUTO: 3.61 K/UL — SIGNIFICANT CHANGE UP (ref 1.8–7.4)
NEUTROPHILS NFR BLD AUTO: 67.1 % — SIGNIFICANT CHANGE UP (ref 43–77)
NEUTROPHILS NFR BLD AUTO: 67.1 % — SIGNIFICANT CHANGE UP (ref 43–77)
NRBC # BLD: 0 /100 WBCS — SIGNIFICANT CHANGE UP (ref 0–0)
NRBC # BLD: 0 /100 WBCS — SIGNIFICANT CHANGE UP (ref 0–0)
NRBC # FLD: 0 K/UL — SIGNIFICANT CHANGE UP (ref 0–0)
NRBC # FLD: 0 K/UL — SIGNIFICANT CHANGE UP (ref 0–0)
PLATELET # BLD AUTO: 307 K/UL — SIGNIFICANT CHANGE UP (ref 150–400)
PLATELET # BLD AUTO: 307 K/UL — SIGNIFICANT CHANGE UP (ref 150–400)
POTASSIUM SERPL-MCNC: 3.9 MMOL/L — SIGNIFICANT CHANGE UP (ref 3.5–5.3)
POTASSIUM SERPL-MCNC: 3.9 MMOL/L — SIGNIFICANT CHANGE UP (ref 3.5–5.3)
POTASSIUM SERPL-SCNC: 3.9 MMOL/L — SIGNIFICANT CHANGE UP (ref 3.5–5.3)
POTASSIUM SERPL-SCNC: 3.9 MMOL/L — SIGNIFICANT CHANGE UP (ref 3.5–5.3)
PROT SERPL-MCNC: 7.3 G/DL — SIGNIFICANT CHANGE UP (ref 6–8.3)
PROT SERPL-MCNC: 7.3 G/DL — SIGNIFICANT CHANGE UP (ref 6–8.3)
PROTHROM AB SERPL-ACNC: 14.1 SEC — HIGH (ref 9.5–13)
PROTHROM AB SERPL-ACNC: 14.1 SEC — HIGH (ref 9.5–13)
RAPID RVP RESULT: DETECTED
RAPID RVP RESULT: DETECTED
RBC # BLD: 4.81 M/UL — SIGNIFICANT CHANGE UP (ref 3.8–5.2)
RBC # BLD: 4.81 M/UL — SIGNIFICANT CHANGE UP (ref 3.8–5.2)
RBC # FLD: 14.3 % — SIGNIFICANT CHANGE UP (ref 10.3–14.5)
RBC # FLD: 14.3 % — SIGNIFICANT CHANGE UP (ref 10.3–14.5)
RSV RNA SPEC QL NAA+PROBE: SIGNIFICANT CHANGE UP
RSV RNA SPEC QL NAA+PROBE: SIGNIFICANT CHANGE UP
RV+EV RNA SPEC QL NAA+PROBE: SIGNIFICANT CHANGE UP
RV+EV RNA SPEC QL NAA+PROBE: SIGNIFICANT CHANGE UP
SARS-COV-2 RNA SPEC QL NAA+PROBE: SIGNIFICANT CHANGE UP
SARS-COV-2 RNA SPEC QL NAA+PROBE: SIGNIFICANT CHANGE UP
SODIUM SERPL-SCNC: 139 MMOL/L — SIGNIFICANT CHANGE UP (ref 135–145)
SODIUM SERPL-SCNC: 139 MMOL/L — SIGNIFICANT CHANGE UP (ref 135–145)
TROPONIN T, HIGH SENSITIVITY RESULT: <6 NG/L — SIGNIFICANT CHANGE UP
TROPONIN T, HIGH SENSITIVITY RESULT: <6 NG/L — SIGNIFICANT CHANGE UP
WBC # BLD: 5.38 K/UL — SIGNIFICANT CHANGE UP (ref 3.8–10.5)
WBC # BLD: 5.38 K/UL — SIGNIFICANT CHANGE UP (ref 3.8–10.5)
WBC # FLD AUTO: 5.38 K/UL — SIGNIFICANT CHANGE UP (ref 3.8–10.5)
WBC # FLD AUTO: 5.38 K/UL — SIGNIFICANT CHANGE UP (ref 3.8–10.5)

## 2023-12-18 PROCEDURE — 99285 EMERGENCY DEPT VISIT HI MDM: CPT

## 2023-12-18 PROCEDURE — 71046 X-RAY EXAM CHEST 2 VIEWS: CPT | Mod: 26

## 2023-12-18 PROCEDURE — 93010 ELECTROCARDIOGRAM REPORT: CPT

## 2023-12-18 RX ORDER — KETOROLAC TROMETHAMINE 30 MG/ML
30 SYRINGE (ML) INJECTION ONCE
Refills: 0 | Status: DISCONTINUED | OUTPATIENT
Start: 2023-12-18 | End: 2023-12-18

## 2023-12-18 RX ORDER — HYDROCODONE BITARTRATE AND HOMATROPINE METHYLBROMIDE 5; 1.5 MG/5ML; MG/5ML
5 SOLUTION ORAL
Qty: 150 | Refills: 0
Start: 2023-12-18 | End: 2023-12-27

## 2023-12-18 RX ORDER — IPRATROPIUM/ALBUTEROL SULFATE 18-103MCG
3 AEROSOL WITH ADAPTER (GRAM) INHALATION ONCE
Refills: 0 | Status: COMPLETED | OUTPATIENT
Start: 2023-12-18 | End: 2023-12-18

## 2023-12-18 RX ORDER — SODIUM CHLORIDE 9 MG/ML
1000 INJECTION INTRAMUSCULAR; INTRAVENOUS; SUBCUTANEOUS ONCE
Refills: 0 | Status: COMPLETED | OUTPATIENT
Start: 2023-12-18 | End: 2023-12-18

## 2023-12-18 RX ORDER — ALBUTEROL 90 UG/1
2 AEROSOL, METERED ORAL
Qty: 1 | Refills: 0
Start: 2023-12-18 | End: 2023-12-27

## 2023-12-18 RX ORDER — ACETAMINOPHEN 500 MG
650 TABLET ORAL ONCE
Refills: 0 | Status: COMPLETED | OUTPATIENT
Start: 2023-12-18 | End: 2023-12-18

## 2023-12-18 RX ADMIN — Medication 30 MILLIGRAM(S): at 14:13

## 2023-12-18 RX ADMIN — Medication 650 MILLIGRAM(S): at 13:32

## 2023-12-18 RX ADMIN — Medication 3 MILLILITER(S): at 13:42

## 2023-12-18 RX ADMIN — SODIUM CHLORIDE 1000 MILLILITER(S): 9 INJECTION INTRAMUSCULAR; INTRAVENOUS; SUBCUTANEOUS at 13:32

## 2023-12-18 NOTE — ED PROVIDER NOTE - NSFOLLOWUPINSTRUCTIONS_ED_ALL_ED_FT
Drink plenty of fluids and get plenty of rest.    Please take medications as prescribed.    If symptoms worsen, please return to the emergency department.    Follow up with your primary care physician.

## 2023-12-18 NOTE — ED ADULT NURSE NOTE - OBJECTIVE STATEMENT
Pt. A&OX4, c/o cough, body aches, weakness for over a month. States it started after getting colonoscopy. Pt. on Eliquis for PE. Respirations even and unlabored at this time. Vitals stable. Denies vomiting, diarrhea. #20g IV placed to right AC, labs sent as ordered.

## 2023-12-18 NOTE — ED PROVIDER NOTE - CLINICAL SUMMARY MEDICAL DECISION MAKING FREE TEXT BOX
43F h/o HTN, PE on Eliquis, presents with one week of malaise, fatigue, body aches, sore throat, nasal and chest congestion.  Pt states she had a similar episode 3 weeks prior (right after an endoscopy), which improved two weeks ago, but now seems to have recurrent symptoms this week.  Denies fevers, SOB, N/V/D.      A/P  - flu-like sx, eval for infection, r/o PNA  - cbc, cmp, trop, rvp, cxr

## 2023-12-18 NOTE — ED PROVIDER NOTE - PATIENT PORTAL LINK FT
You can access the FollowMyHealth Patient Portal offered by [facility] by registering at the following website: [email or web address]. By joining [employer]’s FollowMyHealth portal, you will also be able to view your health information using other applications (apps) compatible with our system. You can access the FollowMyHealth Patient Portal offered by [facility] by registering at the following website: [email or web address]. By joining [employer]’s FollowMyHealth portal, you will also be able to view your health information using other applications (apps) compatible with our system.

## 2023-12-18 NOTE — ED ADULT NURSE NOTE - NSFALLUNIVINTERV_ED_ALL_ED
Bed/Stretcher in lowest position, wheels locked, appropriate side rails in place/Call bell, personal items and telephone in reach/Instruct patient to call for assistance before getting out of bed/chair/stretcher/Non-slip footwear applied when patient is off stretcher/[place] to call system/Physically safe environment - no spills, clutter or unnecessary equipment/Purposeful proactive rounding/Room/bathroom lighting operational, light cord in reach Bed/Stretcher in lowest position, wheels locked, appropriate side rails in place/Call bell, personal items and telephone in reach/Instruct patient to call for assistance before getting out of bed/chair/stretcher/Non-slip footwear applied when patient is off stretcher/[place] to call system/Physically safe environment - no spills, clutter or unnecessary equipment/Purposeful proactive rounding/Room/bathroom lighting operational, light cord in reach

## 2024-03-25 ENCOUNTER — EMERGENCY (EMERGENCY)
Facility: HOSPITAL | Age: 44
LOS: 1 days | Discharge: HOME | End: 2024-03-25
Admitting: EMERGENCY MEDICINE
Payer: COMMERCIAL

## 2024-03-25 VITALS
DIASTOLIC BLOOD PRESSURE: 72 MMHG | TEMPERATURE: 98 F | RESPIRATION RATE: 18 BRPM | HEART RATE: 70 BPM | SYSTOLIC BLOOD PRESSURE: 116 MMHG | OXYGEN SATURATION: 97 %

## 2024-03-25 VITALS
RESPIRATION RATE: 18 BRPM | TEMPERATURE: 98 F | OXYGEN SATURATION: 99 % | DIASTOLIC BLOOD PRESSURE: 81 MMHG | SYSTOLIC BLOOD PRESSURE: 145 MMHG | HEART RATE: 69 BPM

## 2024-03-25 DIAGNOSIS — D21.9 BENIGN NEOPLASM OF CONNECTIVE AND OTHER SOFT TISSUE, UNSPECIFIED: Chronic | ICD-10-CM

## 2024-03-25 DIAGNOSIS — Z98.891 HISTORY OF UTERINE SCAR FROM PREVIOUS SURGERY: Chronic | ICD-10-CM

## 2024-03-25 LAB
ALBUMIN SERPL ELPH-MCNC: 4.1 G/DL — SIGNIFICANT CHANGE UP (ref 3.3–5)
ALP SERPL-CCNC: 77 U/L — SIGNIFICANT CHANGE UP (ref 40–120)
ALT FLD-CCNC: 7 U/L — SIGNIFICANT CHANGE UP (ref 4–33)
ANION GAP SERPL CALC-SCNC: 10 MMOL/L — SIGNIFICANT CHANGE UP (ref 7–14)
AST SERPL-CCNC: 10 U/L — SIGNIFICANT CHANGE UP (ref 4–32)
BASOPHILS # BLD AUTO: 0.03 K/UL — SIGNIFICANT CHANGE UP (ref 0–0.2)
BASOPHILS NFR BLD AUTO: 0.5 % — SIGNIFICANT CHANGE UP (ref 0–2)
BILIRUB SERPL-MCNC: 0.2 MG/DL — SIGNIFICANT CHANGE UP (ref 0.2–1.2)
BUN SERPL-MCNC: 12 MG/DL — SIGNIFICANT CHANGE UP (ref 7–23)
CALCIUM SERPL-MCNC: 9.4 MG/DL — SIGNIFICANT CHANGE UP (ref 8.4–10.5)
CHLORIDE SERPL-SCNC: 103 MMOL/L — SIGNIFICANT CHANGE UP (ref 98–107)
CO2 SERPL-SCNC: 25 MMOL/L — SIGNIFICANT CHANGE UP (ref 22–31)
CREAT SERPL-MCNC: 0.67 MG/DL — SIGNIFICANT CHANGE UP (ref 0.5–1.3)
EGFR: 110 ML/MIN/1.73M2 — SIGNIFICANT CHANGE UP
EOSINOPHIL # BLD AUTO: 0.15 K/UL — SIGNIFICANT CHANGE UP (ref 0–0.5)
EOSINOPHIL NFR BLD AUTO: 2.3 % — SIGNIFICANT CHANGE UP (ref 0–6)
GLUCOSE SERPL-MCNC: 88 MG/DL — SIGNIFICANT CHANGE UP (ref 70–99)
HCG SERPL-ACNC: <1 MIU/ML — SIGNIFICANT CHANGE UP
HCT VFR BLD CALC: 38.9 % — SIGNIFICANT CHANGE UP (ref 34.5–45)
HGB BLD-MCNC: 12.5 G/DL — SIGNIFICANT CHANGE UP (ref 11.5–15.5)
IANC: 3.2 K/UL — SIGNIFICANT CHANGE UP (ref 1.8–7.4)
IMM GRANULOCYTES NFR BLD AUTO: 0.2 % — SIGNIFICANT CHANGE UP (ref 0–0.9)
LYMPHOCYTES # BLD AUTO: 2.82 K/UL — SIGNIFICANT CHANGE UP (ref 1–3.3)
LYMPHOCYTES # BLD AUTO: 42.8 % — SIGNIFICANT CHANGE UP (ref 13–44)
MCHC RBC-ENTMCNC: 28.1 PG — SIGNIFICANT CHANGE UP (ref 27–34)
MCHC RBC-ENTMCNC: 32.1 GM/DL — SIGNIFICANT CHANGE UP (ref 32–36)
MCV RBC AUTO: 87.4 FL — SIGNIFICANT CHANGE UP (ref 80–100)
MONOCYTES # BLD AUTO: 0.38 K/UL — SIGNIFICANT CHANGE UP (ref 0–0.9)
MONOCYTES NFR BLD AUTO: 5.8 % — SIGNIFICANT CHANGE UP (ref 2–14)
NEUTROPHILS # BLD AUTO: 3.2 K/UL — SIGNIFICANT CHANGE UP (ref 1.8–7.4)
NEUTROPHILS NFR BLD AUTO: 48.4 % — SIGNIFICANT CHANGE UP (ref 43–77)
NRBC # BLD: 0 /100 WBCS — SIGNIFICANT CHANGE UP (ref 0–0)
NRBC # FLD: 0 K/UL — SIGNIFICANT CHANGE UP (ref 0–0)
PLATELET # BLD AUTO: 429 K/UL — HIGH (ref 150–400)
POTASSIUM SERPL-MCNC: 4.2 MMOL/L — SIGNIFICANT CHANGE UP (ref 3.5–5.3)
POTASSIUM SERPL-SCNC: 4.2 MMOL/L — SIGNIFICANT CHANGE UP (ref 3.5–5.3)
PROT SERPL-MCNC: 8.1 G/DL — SIGNIFICANT CHANGE UP (ref 6–8.3)
RBC # BLD: 4.45 M/UL — SIGNIFICANT CHANGE UP (ref 3.8–5.2)
RBC # FLD: 14.9 % — HIGH (ref 10.3–14.5)
SODIUM SERPL-SCNC: 138 MMOL/L — SIGNIFICANT CHANGE UP (ref 135–145)
WBC # BLD: 6.59 K/UL — SIGNIFICANT CHANGE UP (ref 3.8–10.5)
WBC # FLD AUTO: 6.59 K/UL — SIGNIFICANT CHANGE UP (ref 3.8–10.5)

## 2024-03-25 PROCEDURE — 71260 CT THORAX DX C+: CPT | Mod: 26,MC

## 2024-03-25 PROCEDURE — 99285 EMERGENCY DEPT VISIT HI MDM: CPT

## 2024-03-25 PROCEDURE — 70450 CT HEAD/BRAIN W/O DYE: CPT | Mod: 26,MC

## 2024-03-25 PROCEDURE — 74177 CT ABD & PELVIS W/CONTRAST: CPT | Mod: 26,MC

## 2024-03-25 PROCEDURE — 72132 CT LUMBAR SPINE W/DYE: CPT | Mod: 26,MC

## 2024-03-25 PROCEDURE — 72129 CT CHEST SPINE W/DYE: CPT | Mod: 26,MC

## 2024-03-25 RX ORDER — CYCLOBENZAPRINE HYDROCHLORIDE 10 MG/1
1 TABLET, FILM COATED ORAL
Qty: 10 | Refills: 0
Start: 2024-03-25 | End: 2024-04-03

## 2024-03-25 RX ORDER — ACETAMINOPHEN 500 MG
1000 TABLET ORAL ONCE
Refills: 0 | Status: COMPLETED | OUTPATIENT
Start: 2024-03-25 | End: 2024-03-25

## 2024-03-25 RX ADMIN — Medication 400 MILLIGRAM(S): at 15:57
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
## 2024-03-25 NOTE — ED PROVIDER NOTE - PATIENT PORTAL LINK FT
You can access the FollowMyHealth Patient Portal offered by [facility] by registering at the following website: [email or web address]. By joining [employer]’s FollowMyHealth portal, you will also be able to view your health information using other applications (apps) compatible with our system.
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
## 2024-03-25 NOTE — ED ADULT NURSE NOTE - NSFALLHARMRISKINTERV_ED_ALL_ED

## 2024-03-25 NOTE — ED PROVIDER NOTE - NSFOLLOWUPINSTRUCTIONS_ED_ALL_ED_FT
Follow up with your primary doctor, see an Orthopedist if the pain continues, take OTC Ibuprofen and or Tylenol for pain, ice your body for the next 2 days, use heat after. Take Cyclobenzaprine which is a muscle relaxer before bedtime, it can make you drowsy, do not drive or drink alcohol with use. Advance activity as tolerated.  Continue all previously prescribed medications as directed.  Follow up with your primary care physician in 48-72 hours- bring copies of your results.  Return to the ER for worsening or persistent symptoms, and/or ANY NEW OR CONCERNING SYMPTOMS. THIS INCLUDES BUT IS NOT LIMITED TO SEVERE PAIN, NUMBNESS, WEAKNESS OR FOR ANY OTHER SYMPTOMS THAT CONCERN YOU. If you have issues obtaining follow up, please call: [phone] to obtain a doctor or specialist who takes your insurance in your area.  You may call [phone] to make an appointment with the internal medicine clinic.
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
## 2024-03-25 NOTE — ED PROVIDER NOTE - PROGRESS NOTE DETAILS
MANUELA Leblanc: Pt was reassessed, has normal ROM of both shoulders, pain is improved, will D/C with outpatient follow up.

## 2025-05-12 ENCOUNTER — APPOINTMENT (OUTPATIENT)
Dept: ORTHOPEDIC SURGERY | Facility: CLINIC | Age: 45
End: 2025-05-12

## 2025-06-02 ENCOUNTER — APPOINTMENT (OUTPATIENT)
Dept: ORTHOPEDIC SURGERY | Facility: CLINIC | Age: 45
End: 2025-06-02